# Patient Record
Sex: FEMALE | Race: BLACK OR AFRICAN AMERICAN | NOT HISPANIC OR LATINO | Employment: UNEMPLOYED | URBAN - METROPOLITAN AREA
[De-identification: names, ages, dates, MRNs, and addresses within clinical notes are randomized per-mention and may not be internally consistent; named-entity substitution may affect disease eponyms.]

---

## 2022-01-13 ENCOUNTER — LAB VISIT (OUTPATIENT)
Dept: PRIMARY CARE CLINIC | Facility: OTHER | Age: 47
End: 2022-01-13
Attending: INTERNAL MEDICINE
Payer: MEDICAID

## 2022-01-13 DIAGNOSIS — Z20.822 ENCOUNTER FOR LABORATORY TESTING FOR COVID-19 VIRUS: ICD-10-CM

## 2022-01-13 PROCEDURE — U0003 INFECTIOUS AGENT DETECTION BY NUCLEIC ACID (DNA OR RNA); SEVERE ACUTE RESPIRATORY SYNDROME CORONAVIRUS 2 (SARS-COV-2) (CORONAVIRUS DISEASE [COVID-19]), AMPLIFIED PROBE TECHNIQUE, MAKING USE OF HIGH THROUGHPUT TECHNOLOGIES AS DESCRIBED BY CMS-2020-01-R: HCPCS | Performed by: INTERNAL MEDICINE

## 2022-01-14 LAB
SARS-COV-2 RNA RESP QL NAA+PROBE: DETECTED
SARS-COV-2- CYCLE NUMBER: 22

## 2022-01-19 ENCOUNTER — LAB VISIT (OUTPATIENT)
Dept: PRIMARY CARE CLINIC | Facility: OTHER | Age: 47
End: 2022-01-19
Attending: INTERNAL MEDICINE
Payer: MEDICAID

## 2022-01-19 DIAGNOSIS — Z20.822 ENCOUNTER FOR LABORATORY TESTING FOR COVID-19 VIRUS: ICD-10-CM

## 2022-01-19 PROCEDURE — U0003 INFECTIOUS AGENT DETECTION BY NUCLEIC ACID (DNA OR RNA); SEVERE ACUTE RESPIRATORY SYNDROME CORONAVIRUS 2 (SARS-COV-2) (CORONAVIRUS DISEASE [COVID-19]), AMPLIFIED PROBE TECHNIQUE, MAKING USE OF HIGH THROUGHPUT TECHNOLOGIES AS DESCRIBED BY CMS-2020-01-R: HCPCS | Performed by: INTERNAL MEDICINE

## 2022-01-20 LAB
SARS-COV-2 RNA RESP QL NAA+PROBE: DETECTED
SARS-COV-2- CYCLE NUMBER: 31

## 2022-01-27 ENCOUNTER — LAB VISIT (OUTPATIENT)
Dept: PRIMARY CARE CLINIC | Facility: OTHER | Age: 47
End: 2022-01-27
Attending: INTERNAL MEDICINE
Payer: MEDICAID

## 2022-01-27 DIAGNOSIS — Z20.822 ENCOUNTER FOR LABORATORY TESTING FOR COVID-19 VIRUS: ICD-10-CM

## 2022-01-27 PROCEDURE — U0003 INFECTIOUS AGENT DETECTION BY NUCLEIC ACID (DNA OR RNA); SEVERE ACUTE RESPIRATORY SYNDROME CORONAVIRUS 2 (SARS-COV-2) (CORONAVIRUS DISEASE [COVID-19]), AMPLIFIED PROBE TECHNIQUE, MAKING USE OF HIGH THROUGHPUT TECHNOLOGIES AS DESCRIBED BY CMS-2020-01-R: HCPCS | Performed by: INTERNAL MEDICINE

## 2022-01-28 LAB
SARS-COV-2 RNA RESP QL NAA+PROBE: NOT DETECTED
SARS-COV-2- CYCLE NUMBER: NORMAL

## 2022-07-09 ENCOUNTER — HOSPITAL ENCOUNTER (EMERGENCY)
Facility: HOSPITAL | Age: 47
Discharge: HOME | End: 2022-07-09
Attending: EMERGENCY MEDICINE
Payer: MEDICAID

## 2022-07-09 VITALS
WEIGHT: 293 LBS | HEART RATE: 69 BPM | HEIGHT: 67 IN | BODY MASS INDEX: 45.99 KG/M2 | DIASTOLIC BLOOD PRESSURE: 89 MMHG | TEMPERATURE: 98 F | OXYGEN SATURATION: 100 % | RESPIRATION RATE: 18 BRPM | SYSTOLIC BLOOD PRESSURE: 177 MMHG

## 2022-07-09 DIAGNOSIS — M19.072 ARTHRITIS OF LEFT FOOT: ICD-10-CM

## 2022-07-09 DIAGNOSIS — R52 PAIN: ICD-10-CM

## 2022-07-09 DIAGNOSIS — M77.32 CALCANEAL SPUR OF LEFT FOOT: Primary | ICD-10-CM

## 2022-07-09 PROCEDURE — 99283 EMERGENCY DEPT VISIT LOW MDM: CPT

## 2022-07-09 RX ORDER — NAPROXEN 500 MG/1
500 TABLET ORAL 2 TIMES DAILY WITH MEALS
Qty: 15 TABLET | Refills: 0 | Status: SHIPPED | OUTPATIENT
Start: 2022-07-09 | End: 2022-09-12 | Stop reason: SDUPTHER

## 2022-07-09 NOTE — Clinical Note
"Ninoska Madridmushtaq" Jose M was seen and treated in our emergency department on 7/9/2022.  She may return to work on 07/12/2022.       If you have any questions or concerns, please don't hesitate to call.      Yang Harrison, DO"

## 2022-07-09 NOTE — ED PROVIDER NOTES
Encounter Date: 7/9/2022       History     Chief Complaint   Patient presents with    HEEL PAIN     LEFT , X 2MOS     47-year-old female presents emergency department with left heel pain.  Has been present off and on for the last 6 months.  Worse with ambulation worse with standing.  It got worse last night.  Described as sharp severe pain left heel.  She says that she tried to put ice on it but it did really help.  Definitely worse with standing on it.  Says she has tried different shoes in the past but none of them seem to really help.  Has not seen anyone for it yet.  Has not tried any medications yet.  Pain does not radiate described as an aching type pain.  No trauma fall or injury reported.  No fevers or chills reported.        Review of patient's allergies indicates:   Allergen Reactions    Aspirin      Past Medical History:   Diagnosis Date    Anemia, unspecified     Hypertension     Obese body habitus      No past surgical history on file.  No family history on file.     Review of Systems   Constitutional: Negative for chills and fever.   HENT: Negative for sore throat.    Respiratory: Negative for shortness of breath.    Cardiovascular: Negative for chest pain and palpitations.   Gastrointestinal: Negative for abdominal pain, nausea and vomiting.   Genitourinary: Negative for dysuria.   Musculoskeletal: Positive for arthralgias (Left heel). Negative for back pain.   Skin: Negative for rash.   Neurological: Negative for weakness and headaches.   Hematological: Does not bruise/bleed easily.   All other systems reviewed and are negative.      Physical Exam     Initial Vitals [07/09/22 1234]   BP Pulse Resp Temp SpO2   (!) 177/89 69 18 97.8 °F (36.6 °C) 100 %      MAP       --         Physical Exam    Nursing note and vitals reviewed.  Constitutional: She appears well-developed and well-nourished. No distress.   Elevated BMI   HENT:   Head: Normocephalic and atraumatic.   Mouth/Throat: No oropharyngeal  exudate.   Eyes: Conjunctivae and EOM are normal. Pupils are equal, round, and reactive to light.   Neck: Neck supple. No tracheal deviation present.   Cardiovascular: Normal rate, regular rhythm, normal heart sounds and intact distal pulses.   No murmur heard.  Pulmonary/Chest: Breath sounds normal. No stridor. No respiratory distress. She has no wheezes. She has no rhonchi. She has no rales.   Abdominal: Abdomen is soft. She exhibits no distension. There is no abdominal tenderness. There is no rebound.   Musculoskeletal:         General: No tenderness or edema. Normal range of motion.      Cervical back: Neck supple.      Comments: Left foot:  There is tenderness present to the calcaneus region.  No erythema no warmth no drainage no pointing or fluctuance.  Negative Rob test.  No tenderness to the midfoot neurovascular intact distally.  Normal capillary refill.  2+ pulse present in the dorsalis pedis artery and the posterior tibial artery.     Neurological: She is alert and oriented to person, place, and time. She has normal strength. No cranial nerve deficit or sensory deficit.   Skin: Skin is warm and dry. Capillary refill takes less than 2 seconds. No rash noted. No erythema. No pallor.   Psychiatric: She has a normal mood and affect. Thought content normal.         ED Course   Procedures  Labs Reviewed - No data to display       Imaging Results          X-Ray Foot Complete Left (Final result)  Result time 07/09/22 14:13:40    Final result by Timoteo Molina Jr., MD (07/09/22 14:13:40)                 Narrative:    Examination: 3 views of the left foot.    CLINICAL HISTORY: Left foot heel pain.    COMPARISON: None.    TECHNIQUE: AP, lateral, and oblique projections.    FINDINGS: Osseous structures are intact without evidence of an acute fracture deformity. Lisfranc joints are anatomically aligned. Early osteoarthritic changes about the great toe metatarsophalangeal joint. Mild hallux findings alignment.  Prominent plantar calcaneal heel spur and Achilles enthesophyte formation on lateral distraction. Mild degenerative spurring about the dorsal midfoot. Elongation the posterior talar process versus a benign os trigonum.    IMPRESSION: No acute radiographic abnormality. Prominent plantar calcaneal heel spur and Achilles enthesophyte formation though no secondary manifestations of plantar fasciitis.    Electronically signed by:  Timoteo oMlina MD  7/9/2022 2:13 PM CDT Workstation: [id number]                             X-Ray Calcaneus 2 View Left (Final result)  Result time 07/09/22 14:04:08    Final result by Timoteo Molina Jr., MD (07/09/22 14:04:08)                 Narrative:    Examination: 2 views of the os calcis.    CLINICAL HISTORY: Heel and foot pain for past 2 months. Patient standing at work all day    TECHNIQUE: AP and lateral views the os calcis.    FINDINGS: Osseous mineralization appears normal. Chronic osteoarthritic spurring about the dorsal midfoot and dorsal navicula. Elongation of the posterior talar process versus benign os trigonum. Prominent plantar calcaneal heel spur and an early enthesophyte formation at the Achilles tendon insertion point with fairly prominent hypertrophic reactive bone formation the posterior edge of the stress calcaneus. No evidence of an acute fracture. Subtalar joint is normal in radiographic appearance. Soft tissues are unremarkable.    IMPRESSION:  1. No acute as an etiology.  2. Prominent plantar calcaneal and Achilles enthesophyte formation.    Electronically signed by:  Timoteo Molina MD  7/9/2022 2:04 PM CDT Workstation: [id number]                               Medications - No data to display  Medical Decision Making:   ED Management:  Patient presents emergency department with heel pain as described above for months.  No obvious infectious etiology on exam, no fracture dislocation on x-ray.  Patient has significant spurring of her heel and significant  arthritis.  I counseled her on this and the importance of following up with Podiatry in well fitting shoes.  Counseled her on weight loss.  She will follow-up with podiatry on outpatient basis.    I had a detailed discussion with the patient and/or guardian regarding: The historical points, exam findings, and diagnostic results supporting the discharge diagnosis, lab results, pertinent radiology results, and the need for outpatient follow-up, for definitive care with a family practitioner, and a podiatrist and to return to the emergency department if symptoms worsen or persist or if there are any questions or concerns that arise at home. All questions have been answered in detail. Strict return to Emergency Department precautions have been provided.    A dictation software program was used for this note.  Please expect some simple typographical  errors in this note.                       Clinical Impression:   Final diagnoses:  [R52] Pain  [M77.32] Calcaneal spur of left foot (Primary)  [M19.072] Arthritis of left foot          ED Disposition Condition    Discharge Stable        ED Prescriptions     Medication Sig Dispense Start Date End Date Auth. Provider    naproxen (NAPROSYN) 500 MG tablet Take 1 tablet (500 mg total) by mouth 2 (two) times daily with meals. 15 tablet 7/9/2022  Yang Harrison, DO        Follow-up Information     Follow up With Specialties Details Why Contact Info Additional Information    [facility] - Emergency Dept Emergency Medicine  If symptoms worsen [address] [address]  [phone] 1st floor    Timoteo Hernandez DPM Podiatry, Surgery In 3 days  [address]  [address]  [place] [facility] [address]  [phone]              Yang Harrison DO  07/09/22 2125

## 2022-07-09 NOTE — DISCHARGE INSTRUCTIONS
RETURN TO EMERGENCY DEPARTMENT WITHOUT FAIL, IF YOUR SYMPTOMS WORSEN, IF YOU GET NEW OR DIFFERENT SYMPTOMS, IF YOU ARE UNABLE TO FOLLOW UP AS DIRECTED, OR IF YOU HAVE ANY CONCERNS OR WORRIES.    Wear proper shoes.  Follow-up with podiatry on an outpatient basis.  Anti-inflammatories for pain.

## 2022-08-24 ENCOUNTER — HOSPITAL ENCOUNTER (EMERGENCY)
Facility: HOSPITAL | Age: 47
Discharge: HOME | End: 2022-08-24
Attending: EMERGENCY MEDICINE
Payer: MEDICAID

## 2022-08-24 VITALS
SYSTOLIC BLOOD PRESSURE: 164 MMHG | RESPIRATION RATE: 18 BRPM | BODY MASS INDEX: 45.99 KG/M2 | HEART RATE: 78 BPM | HEIGHT: 67 IN | OXYGEN SATURATION: 97 % | WEIGHT: 293 LBS | TEMPERATURE: 98 F | DIASTOLIC BLOOD PRESSURE: 86 MMHG

## 2022-08-24 DIAGNOSIS — M77.31 CALCANEAL SPUR OF RIGHT FOOT: Primary | ICD-10-CM

## 2022-08-24 DIAGNOSIS — R52 PAIN: ICD-10-CM

## 2022-08-24 DIAGNOSIS — M19.071 ARTHRITIS OF FOOT, RIGHT: ICD-10-CM

## 2022-08-24 PROCEDURE — 99283 EMERGENCY DEPT VISIT LOW MDM: CPT

## 2022-08-24 NOTE — ED PROVIDER NOTES
Encounter Date: 8/24/2022       History     Chief Complaint   Patient presents with    Foot Pain     Right foot pain x unknown period. Pt reports a work history of long hours standing on her feet. Pt has been dx with a spur on the left heel before. No redness or swelling present. Pt ambulated to triage without difficulty.      Ninoska Salguero is a 47 year old female with pmh HTN presenting to the ED with c/o right foot pain. She reports similar pain in the left foot that has been diagnosed as bone spurs and arthritis. She denies injury/trauma and states that she stands on her feet for long periods of time. She reports pain began 2 weeks ago and has been slightly improved with ibuprofen. She has had no calf pain/swelling.         Review of patient's allergies indicates:   Allergen Reactions    Aspirin      Past Medical History:   Diagnosis Date    Anemia, unspecified     Hypertension     Obese body habitus      No past surgical history on file.  No family history on file.     Review of Systems   Constitutional: Negative for fever.   HENT: Negative for sore throat.    Respiratory: Negative for shortness of breath.    Cardiovascular: Negative for chest pain.   Gastrointestinal: Negative for nausea.   Genitourinary: Negative for dysuria.   Musculoskeletal: Positive for arthralgias (bilateral foot pain; new in right foot) and gait problem (pain with weight bearing). Negative for back pain.   Skin: Negative for rash.   Neurological: Negative for weakness.   Hematological: Does not bruise/bleed easily.       Physical Exam     Initial Vitals [08/24/22 0929]   BP Pulse Resp Temp SpO2   (!) 193/105 78 18 98.2 °F (36.8 °C) 97 %      MAP       --         Physical Exam    Nursing note and vitals reviewed.  Constitutional: She appears well-developed and well-nourished. She is not diaphoretic. No distress.   HENT:   Head: Normocephalic and atraumatic.   Mouth/Throat: Oropharynx is clear and moist.   Eyes: Conjunctivae are  normal.   Neck: Neck supple.   Cardiovascular: Normal rate, regular rhythm, normal heart sounds and intact distal pulses. Exam reveals no gallop and no friction rub.    No murmur heard.  Pulmonary/Chest: Breath sounds normal. No respiratory distress. She has no wheezes. She has no rhonchi. She has no rales.   Musculoskeletal:         General: Normal range of motion.      Cervical back: Neck supple.      Right foot: Normal capillary refill. Tenderness (heel of foot) present. No swelling or deformity. Normal pulse.      Comments: No calf tenderness/swelling. No palpable cord     Neurological: She is alert and oriented to person, place, and time.   Skin: No rash noted. No erythema.   Psychiatric: Her speech is normal.         ED Course   Procedures  Labs Reviewed - No data to display       Imaging Results          X-Ray Foot Complete Right (Final result)  Result time 08/24/22 11:22:26    Final result by Ermelinda Lyman IV, MD (08/24/22 11:22:26)                 Narrative:    Right foot, 3 views    HISTORY: Nontraumatic foot pain.    No acute fracture, dislocation or osseous destruction is observed. Osteoarthritic changes are demonstrated within the midfoot. There is mild degenerative tearing of the first MTP joint. Enthesophyte/spur formation is noted along the calcaneal tuberosity. No focal soft tissue abnormality identified.    IMPRESSION:    Osteoarthritic changes.    Calcaneal spur/enthesophyte formation.    Electronically signed by:  Ermelinda Lyman MD  8/24/2022 11:22 AM CDT Workstation: [id number]                               Medications - No data to display        APC / Resident Notes:   The patient has findings consistent with calcaneal spur as well as osteoarthritis on xray. There is no fracture/dislocation. She has no symptoms concerning for DVT/arterial occlusion. Also no exam findings concerning for infection/gout. Her BP is elevated and she states she did not take her prescribed BP medications this morning.  She was advised to take her BP medication as soon as possible and to return if she develops any new or worsening symptoms. Will refer to podiatry for follow up as well. Based on my clinical evaluation, I do not appreciate any immediate, emergent, or life threatening condition or etiology that warrants additional workup today and feel that the patient can be discharged with close follow up care.                    Clinical Impression:   Final diagnoses:  [R52] Pain  [M77.31] Calcaneal spur of right foot (Primary)  [M19.071] Arthritis of foot, right          ED Disposition Condition    Discharge Stable        ED Prescriptions     None        Follow-up Information     Follow up With Specialties Details Why Contact Info Additional Information    [facility] - Emergency Dept Emergency Medicine  As needed, If symptoms worsen [address]  [facility] [address]  [phone] 1st floor    [facility]  Schedule an appointment as soon as possible for a visit   [facility] [address]   [facility] [address]  [phone]              Dorinda Betts NP  08/24/22 1141

## 2022-08-24 NOTE — Clinical Note
"Ninoska Browncelio" Jose M was seen and treated in our emergency department on 8/24/2022.  She may return to work on 08/27/2022.       If you have any questions or concerns, please don't hesitate to call.      Robe Mesa RN    "

## 2022-09-12 ENCOUNTER — OFFICE VISIT (OUTPATIENT)
Dept: PODIATRY | Facility: CLINIC | Age: 47
End: 2022-09-12
Payer: MEDICAID

## 2022-09-12 VITALS — HEIGHT: 67 IN | BODY MASS INDEX: 45.99 KG/M2 | WEIGHT: 293 LBS

## 2022-09-12 DIAGNOSIS — M19.071 ARTHRITIS OF FOOT, RIGHT: ICD-10-CM

## 2022-09-12 DIAGNOSIS — M77.31 CALCANEAL SPUR OF RIGHT FOOT: Primary | ICD-10-CM

## 2022-09-12 PROCEDURE — 1160F PR REVIEW ALL MEDS BY PRESCRIBER/CLIN PHARMACIST DOCUMENTED: ICD-10-PCS | Mod: CPTII,S$GLB,, | Performed by: PODIATRIST

## 2022-09-12 PROCEDURE — 1159F PR MEDICATION LIST DOCUMENTED IN MEDICAL RECORD: ICD-10-PCS | Mod: CPTII,S$GLB,, | Performed by: PODIATRIST

## 2022-09-12 PROCEDURE — 99203 OFFICE O/P NEW LOW 30 MIN: CPT | Mod: S$GLB,,, | Performed by: PODIATRIST

## 2022-09-12 PROCEDURE — 3008F BODY MASS INDEX DOCD: CPT | Mod: CPTII,S$GLB,, | Performed by: PODIATRIST

## 2022-09-12 PROCEDURE — 3008F PR BODY MASS INDEX (BMI) DOCUMENTED: ICD-10-PCS | Mod: CPTII,S$GLB,, | Performed by: PODIATRIST

## 2022-09-12 PROCEDURE — 1160F RVW MEDS BY RX/DR IN RCRD: CPT | Mod: CPTII,S$GLB,, | Performed by: PODIATRIST

## 2022-09-12 PROCEDURE — 1159F MED LIST DOCD IN RCRD: CPT | Mod: CPTII,S$GLB,, | Performed by: PODIATRIST

## 2022-09-12 PROCEDURE — 99203 PR OFFICE/OUTPT VISIT, NEW, LEVL III, 30-44 MIN: ICD-10-PCS | Mod: S$GLB,,, | Performed by: PODIATRIST

## 2022-09-12 RX ORDER — LISINOPRIL 40 MG/1
40 TABLET ORAL DAILY
COMMUNITY

## 2022-09-12 RX ORDER — NAPROXEN 500 MG/1
500 TABLET ORAL 2 TIMES DAILY WITH MEALS
Qty: 15 TABLET | Refills: 2 | Status: SHIPPED | OUTPATIENT
Start: 2022-09-12 | End: 2022-09-12 | Stop reason: CLARIF

## 2022-09-12 RX ORDER — NAPROXEN 500 MG/1
500 TABLET ORAL 2 TIMES DAILY
Qty: 60 TABLET | Refills: 1 | Status: SHIPPED | OUTPATIENT
Start: 2022-09-12 | End: 2022-11-23

## 2022-09-12 RX ORDER — FERROUS SULFATE 325(65) MG
325 TABLET ORAL DAILY
COMMUNITY

## 2022-09-12 NOTE — PROGRESS NOTES
"  [address] [place] [address]  Phone: [phone]   [phone]    Patient's PCP:Primary Doctor No  Referring Provider: Dorinda Betts    Subjective:      Chief Complaint:: Foot Pain (Bilateral foot pain)    KSENIA Salguero is a 47 y.o. female who presents today with a complaint of bilateral foot pain lasting for a few months. Onset of symptoms back in January and reports no trauma.  Current symptoms include pain on medial side of bilateral feet, as well as heel pain.  Aggravating factors are walking and standing for long periods of time. Symptoms have progressed. Patient rates pain 10/10.        Vitals:    09/12/22 1054   Weight: (!) 142.4 kg (314 lb)   Height: 5' 7" (1.702 m)   PainSc: 10-Worst pain ever      Shoe Size: 11    No past surgical history on file.  Past Medical History:   Diagnosis Date    Anemia, unspecified     Hypertension     Obese body habitus      No family history on file.     Social History:   Marital Status: Single  Alcohol History:  has no history on file for alcohol use.  Tobacco History:  has no history on file for tobacco use.  Drug History:  has no history on file for drug use.    Review of patient's allergies indicates:   Allergen Reactions    Aspirin        Current Outpatient Medications   Medication Sig Dispense Refill    ferrous sulfate (FEOSOL) 325 mg (65 mg iron) Tab tablet       lisinopriL (PRINIVIL,ZESTRIL) 40 MG tablet       naproxen (NAPROSYN) 500 MG tablet Take 1 tablet (500 mg total) by mouth 2 (two) times daily with meals. 15 tablet 0     No current facility-administered medications for this visit.       Review of Systems   Constitutional: Negative.    HENT: Negative.     Eyes: Negative.    Respiratory: Negative.     Cardiovascular: Negative.    Gastrointestinal: Negative.    Endocrine: Negative.    Genitourinary: Negative.    Musculoskeletal: Negative.    Skin: Negative.    Allergic/Immunologic: Negative.    Neurological: Negative.  "   Hematological: Negative.    Psychiatric/Behavioral: Negative.         Objective:        Physical Exam:   Foot Exam    General  General Appearance: appears stated age and healthy   Orientation: alert and oriented to person, place, and time   Affect: appropriate   Gait: antalgic       Right Foot/Ankle     Inspection and Palpation  Ecchymosis: none  Tenderness: calcaneus tenderness, lesser metatarsophalangeal joints and plantar fascia   Swelling: none   Arch: pes planus  Hammertoes: absent  Claw Toes: absent  Hallux valgus: no  Hallux limitus: yes  Skin Exam: tinea;   Fungus Toenails: not present  Neurovascular  Dorsalis pedis: 2+  Posterior tibial: 2+  Capillary Refill: 2+  Saphenous nerve sensation: normal  Tibial nerve sensation: normal  Superficial peroneal nerve sensation: normal  Deep peroneal nerve sensation: normal  Sural nerve sensation: normal    Muscle Strength  Ankle dorsiflexion: 5  Ankle plantar flexion: 5  Ankle inversion: 5  Ankle eversion: 5  Great toe extension: 5  Great toe flexion: 5    Range of Motion    Normal right ankle ROM  Passive  Ankle dorsiflexion: 10    Active  Ankle dorsiflexion: 10    Tests  Calcaneal squeeze: negative   PT Tinel's sign: negative    Paresthesia: negative  Comments  Pain on palpation of the infeior medial heel and central plantar heel. No pain present  with side to side compression of the calcaneus. Negative tinnel's sign  at the tarsal tunnel. Negative Teran's nerve pain. Negative Calcaneal nerve pain. No soft tissue masses. Pain present with dorsiflexion of the ankle. No edema, erythema, or ecchymosis noted.      Left Foot/Ankle      Inspection and Palpation  Ecchymosis: none  Tenderness: lesser metatarsophalangeal joints, plantar fascia and calcaneus tenderness   Swelling: none   Arch: pes planus  Hammertoes: absent  Claw toes: absent  Hallux valgus: no  Hallux limitus: yes  Skin Exam: tinea;   Fungus Toenails: not present    Neurovascular  Dorsalis pedis:  2+  Posterior tibial: 2+  Capillary refill: 2+  Saphenous nerve sensation: normal  Tibial nerve sensation: normal  Superficial peroneal nerve sensation: normal  Deep peroneal nerve sensation: normal  Sural nerve sensation: normal    Muscle Strength  Ankle dorsiflexion: 5  Ankle plantar flexion: 5  Ankle inversion: 5  Ankle eversion: 5  Great toe extension: 5  Great toe flexion: 5    Range of Motion    Normal left ankle ROM  Passive  Ankle dorsiflexion: 10    Active  Ankle dorsiflexion: 10    Tests  Calcaneal squeeze: negative   PT Tinel's sign: negative  Paresthesia: negative  Comments  Pain on palpation of the infeior medial heel and central plantar heel. No pain present  with side to side compression of the calcaneus. Negative tinnel's sign  at the tarsal tunnel. Negative Teran's nerve pain. Negative Calcaneal nerve pain. No soft tissue masses. Pain present with dorsiflexion of the ankle. No edema, erythema, or ecchymosis noted.    Physical Exam  Cardiovascular:      Pulses:           Dorsalis pedis pulses are 2+ on the right side and 2+ on the left side.        Posterior tibial pulses are 2+ on the right side and 2+ on the left side.   Musculoskeletal:      Right foot: No bunion.      Left foot: No bunion.        Feet:    Feet:      Right foot:      Toenail Condition: Fungal disease     Left foot:      Toenail Condition: Fungal disease            Right Ankle/Foot Exam     Tenderness   The patient is tender to palpation of the lesser metatarsophalangeal joints.    Range of Motion   The patient has normal right ankle ROM.    Comments:  Pain on palpation of the infeior medial heel and central plantar heel. No pain present  with side to side compression of the calcaneus. Negative tinnel's sign  at the tarsal tunnel. Negative Teran's nerve pain. Negative Calcaneal nerve pain. No soft tissue masses. Pain present with dorsiflexion of the ankle. No edema, erythema, or ecchymosis noted.      Left Ankle/Foot Exam      Tenderness   The patient is tender to palpation of the lesser metatarsophalangeal joints.    Range of Motion   The patient has normal left ankle ROM.     Muscle Strength   The patient has normal left ankle strength.    Comments:  Pain on palpation of the infeior medial heel and central plantar heel. No pain present  with side to side compression of the calcaneus. Negative tinnel's sign  at the tarsal tunnel. Negative Teran's nerve pain. Negative Calcaneal nerve pain. No soft tissue masses. Pain present with dorsiflexion of the ankle. No edema, erythema, or ecchymosis noted.        Muscle Strength   Right Lower Extremity   Ankle Dorsiflexion:  5   Plantar flexion:  5/5  Left Lower Extremity   Ankle Dorsiflexion:  5   Plantar flexion:  5/5     Vascular Exam     Right Pulses  Dorsalis Pedis:      2+  Posterior Tibial:      2+        Left Pulses  Dorsalis Pedis:      2+  Posterior Tibial:      2+         Imaging:   Reviewed her x-rays from outside source of nonweightbearing of both feet which show inferior posterior calcaneal exostosis and multiple dorsal osteophytes at Lisfranc send mid tarsal joint.  No bone tumors no soft tissue masses seen.         Assessment:       1. Calcaneal spur of right foot    2. Arthritis of foot, right      Plan:   Calcaneal spur of right foot  -     Ambulatory referral/consult to Podiatry    Arthritis of foot, right  -     Ambulatory referral/consult to Podiatry      1. Evaluated patient long discussion with her about clinical findings of plantar fasciitis of both feet arthritis of both feet worse than left.  We will try conservative care for the plantar fasciitis and see if this helps with the fasciitis and her arthritis.  I told her is no way can promise or 100% pain relief because the arthritic changes in the foot.    Discussed different treatment options for heel pain. I gave written and verbal instructions on heel cord stretching and this was demonstrated for the patient. Patient  expressed understanding. Discussed wearing appropriate shoe gear and avoiding flats, slippers, sandals, barefoot, and sockfeet. Recommended arch supports. My recommendation for OTC supports is Spenco polysorb replacement insoles or patient may elect more aggressive treatment with prescription arch supports. We also discussed cortisone injections and NSAID therapy.      Plantar Fasciitis Level I Treatment:   Anti-inflammatory Naprosyn 501 pill twice a day with food  No bare feet  Over the counter insert cross trainers by Alyssa  Restrict activity level   Use running shoes at full time refer to varsity sports  No impact exercise and stretch gastrocnemius muscle   Return in 4 weeks if not at least 30% improvement.  If doing better continue treatment until resolved.    I discussed the conservative treatent of arthritis consisiting of shoe modification, OTC NSAID, cortisone shots, a series of supartz injections, avoiding painful activities and common sense.  I explained that the arthritis may become more painful causng more disability and the possibility of further treatment.  Also discussed may need evaluation by Rheumatologist for possible inflammatory arthritis.  We will see she gets good relief with the arthritis with the treatment for the plantar fasciitis with shoes and inserts.    Procedures          Counseling:     I provided patient education verbally regarding:   Patient diagnosis, treatment options, as well as alternatives, risks, and benefits.     This note was created using Dragon voice recognition software that occasionally misinterpreted phrases or words.

## 2022-09-12 NOTE — PATIENT INSTRUCTIONS
What Is Arthritis in the Foot?  Degenerative arthritis is a condition that slowly wears away joints, the area where bones meet and move. In the beginning, you may notice that the affected joint seems stiff. It may even ache. As the joint lining (cartilage) breaks down, the bones rub against each other, causing pain and swelling. Over time, small pieces of rough or splintered bone (bone spurs) develop, and the joints range of motion becomes limited. But movement doesnt have to cause pain. The effects of arthritis can be reduced.    The big-toe joint  When arthritis affects your big toe, your foot hurts when it pushes off the ground. Arthritis often appears in the big-toe joint along with a bunion (a bony bump at the side of the joint) or a bone spur on top of the joint.    Other joints  When arthritis affects the rear or midfoot joints, you feel pain when you put weight on your foot. Arthritis may affect the joint where the ankle and foot meet. It may also affect other joints nearby.  Date Last Reviewed: 7/1/2016 © 2000-2017 [employer]. [address] [place] [address], [place] [address]. All rights reserved. This information is not intended as a substitute for professional medical care. Always follow your healthcare professional's instructions.

## 2022-10-25 ENCOUNTER — HOSPITAL ENCOUNTER (EMERGENCY)
Facility: HOSPITAL | Age: 47
Discharge: HOME | End: 2022-10-25
Attending: EMERGENCY MEDICINE
Payer: MEDICAID

## 2022-10-25 VITALS
WEIGHT: 293 LBS | SYSTOLIC BLOOD PRESSURE: 185 MMHG | HEART RATE: 86 BPM | BODY MASS INDEX: 45.99 KG/M2 | HEIGHT: 67 IN | DIASTOLIC BLOOD PRESSURE: 88 MMHG | OXYGEN SATURATION: 99 % | TEMPERATURE: 99 F | RESPIRATION RATE: 16 BRPM

## 2022-10-25 DIAGNOSIS — N92.0 MENORRHAGIA: ICD-10-CM

## 2022-10-25 DIAGNOSIS — O03.4 INCOMPLETE ABORTION: Primary | ICD-10-CM

## 2022-10-25 LAB
ABO + RH BLD: NORMAL
ALBUMIN SERPL BCP-MCNC: 3.7 G/DL (ref 3.5–5.2)
ALP SERPL-CCNC: 56 U/L (ref 55–135)
ALT SERPL W/O P-5'-P-CCNC: 15 U/L (ref 10–44)
ANION GAP SERPL CALC-SCNC: 4 MMOL/L (ref 8–16)
AST SERPL-CCNC: 15 U/L (ref 10–40)
B-HCG UR QL: POSITIVE
BACTERIA #/AREA URNS HPF: NEGATIVE /HPF
BASOPHILS # BLD AUTO: 0.03 K/UL (ref 0–0.2)
BASOPHILS NFR BLD: 0.3 % (ref 0–1.9)
BILIRUB SERPL-MCNC: 0.3 MG/DL (ref 0.1–1)
BILIRUB UR QL STRIP: NEGATIVE
BLD GP AB SCN CELLS X3 SERPL QL: NORMAL
BUN SERPL-MCNC: 14 MG/DL (ref 6–20)
CALCIUM SERPL-MCNC: 8.8 MG/DL (ref 8.7–10.5)
CHLORIDE SERPL-SCNC: 110 MMOL/L (ref 95–110)
CLARITY UR: ABNORMAL
CO2 SERPL-SCNC: 24 MMOL/L (ref 23–29)
COLOR UR: YELLOW
CREAT SERPL-MCNC: 0.8 MG/DL (ref 0.5–1.4)
CTP QC/QA: YES
DIFFERENTIAL METHOD: ABNORMAL
EOSINOPHIL # BLD AUTO: 0.1 K/UL (ref 0–0.5)
EOSINOPHIL NFR BLD: 1.3 % (ref 0–8)
ERYTHROCYTE [DISTWIDTH] IN BLOOD BY AUTOMATED COUNT: 18.3 % (ref 11.5–14.5)
EST. GFR  (NO RACE VARIABLE): >60 ML/MIN/1.73 M^2
GLUCOSE SERPL-MCNC: 99 MG/DL (ref 70–110)
GLUCOSE UR QL STRIP: NEGATIVE
HCG INTACT+B SERPL-ACNC: 2259 MIU/ML
HCT VFR BLD AUTO: 35.5 % (ref 37–48.5)
HGB BLD-MCNC: 10.7 G/DL (ref 12–16)
HGB UR QL STRIP: ABNORMAL
HYALINE CASTS #/AREA URNS LPF: 4 /LPF
IMM GRANULOCYTES # BLD AUTO: 0.02 K/UL (ref 0–0.04)
IMM GRANULOCYTES NFR BLD AUTO: 0.2 % (ref 0–0.5)
KETONES UR QL STRIP: ABNORMAL
LEUKOCYTE ESTERASE UR QL STRIP: NEGATIVE
LYMPHOCYTES # BLD AUTO: 3.2 K/UL (ref 1–4.8)
LYMPHOCYTES NFR BLD: 31.8 % (ref 18–48)
MCH RBC QN AUTO: 22.7 PG (ref 27–31)
MCHC RBC AUTO-ENTMCNC: 30.1 G/DL (ref 32–36)
MCV RBC AUTO: 75 FL (ref 82–98)
MICROSCOPIC COMMENT: ABNORMAL
MONOCYTES # BLD AUTO: 0.6 K/UL (ref 0.3–1)
MONOCYTES NFR BLD: 5.5 % (ref 4–15)
NEUTROPHILS # BLD AUTO: 6.1 K/UL (ref 1.8–7.7)
NEUTROPHILS NFR BLD: 60.9 % (ref 38–73)
NITRITE UR QL STRIP: NEGATIVE
NRBC BLD-RTO: 0 /100 WBC
PH UR STRIP: 6 [PH] (ref 5–8)
PLATELET # BLD AUTO: 297 K/UL (ref 150–450)
PMV BLD AUTO: 10.1 FL (ref 9.2–12.9)
POTASSIUM SERPL-SCNC: 3.6 MMOL/L (ref 3.5–5.1)
PROT SERPL-MCNC: 7.7 G/DL (ref 6–8.4)
PROT UR QL STRIP: ABNORMAL
RBC # BLD AUTO: 4.71 M/UL (ref 4–5.4)
RBC #/AREA URNS HPF: >100 /HPF (ref 0–4)
RH BLD: NORMAL
SODIUM SERPL-SCNC: 138 MMOL/L (ref 136–145)
SP GR UR STRIP: 1.03 (ref 1–1.03)
SQUAMOUS #/AREA URNS HPF: 3 /HPF
URN SPEC COLLECT METH UR: ABNORMAL
UROBILINOGEN UR STRIP-ACNC: NEGATIVE EU/DL
WBC # BLD AUTO: 9.94 K/UL (ref 3.9–12.7)
WBC #/AREA URNS HPF: 14 /HPF (ref 0–5)

## 2022-10-25 PROCEDURE — 96372 THER/PROPH/DIAG INJ SC/IM: CPT | Performed by: STUDENT IN AN ORGANIZED HEALTH CARE EDUCATION/TRAINING PROGRAM

## 2022-10-25 PROCEDURE — 63600175 PHARM REV CODE 636 W HCPCS: Performed by: STUDENT IN AN ORGANIZED HEALTH CARE EDUCATION/TRAINING PROGRAM

## 2022-10-25 PROCEDURE — 80053 COMPREHEN METABOLIC PANEL: CPT | Performed by: STUDENT IN AN ORGANIZED HEALTH CARE EDUCATION/TRAINING PROGRAM

## 2022-10-25 PROCEDURE — 86901 BLOOD TYPING SEROLOGIC RH(D): CPT | Mod: 91 | Performed by: STUDENT IN AN ORGANIZED HEALTH CARE EDUCATION/TRAINING PROGRAM

## 2022-10-25 PROCEDURE — 81025 URINE PREGNANCY TEST: CPT | Performed by: STUDENT IN AN ORGANIZED HEALTH CARE EDUCATION/TRAINING PROGRAM

## 2022-10-25 PROCEDURE — 85025 COMPLETE CBC W/AUTO DIFF WBC: CPT | Performed by: STUDENT IN AN ORGANIZED HEALTH CARE EDUCATION/TRAINING PROGRAM

## 2022-10-25 PROCEDURE — 99285 EMERGENCY DEPT VISIT HI MDM: CPT | Mod: 25

## 2022-10-25 PROCEDURE — 36415 COLL VENOUS BLD VENIPUNCTURE: CPT | Performed by: EMERGENCY MEDICINE

## 2022-10-25 PROCEDURE — 81001 URINALYSIS AUTO W/SCOPE: CPT | Performed by: STUDENT IN AN ORGANIZED HEALTH CARE EDUCATION/TRAINING PROGRAM

## 2022-10-25 PROCEDURE — 86901 BLOOD TYPING SEROLOGIC RH(D): CPT | Performed by: STUDENT IN AN ORGANIZED HEALTH CARE EDUCATION/TRAINING PROGRAM

## 2022-10-25 PROCEDURE — 84702 CHORIONIC GONADOTROPIN TEST: CPT | Performed by: EMERGENCY MEDICINE

## 2022-10-25 RX ORDER — METHYLERGONOVINE MALEATE 0.2 MG/1
0.2 TABLET ORAL EVERY 6 HOURS
Qty: 4 TABLET | Refills: 0 | Status: SHIPPED | OUTPATIENT
Start: 2022-10-25 | End: 2022-10-26

## 2022-10-25 RX ORDER — METHYLERGONOVINE MALEATE 0.2 MG/ML
200 INJECTION INTRAVENOUS ONCE
Status: COMPLETED | OUTPATIENT
Start: 2022-10-25 | End: 2022-10-25

## 2022-10-25 RX ORDER — KETOROLAC TROMETHAMINE 30 MG/ML
10 INJECTION, SOLUTION INTRAMUSCULAR; INTRAVENOUS
Status: COMPLETED | OUTPATIENT
Start: 2022-10-25 | End: 2022-10-25

## 2022-10-25 RX ADMIN — KETOROLAC TROMETHAMINE 10 MG: 30 INJECTION, SOLUTION INTRAMUSCULAR; INTRAVENOUS at 06:10

## 2022-10-25 RX ADMIN — METHYLERGONOVINE MALEATE 200 MCG: 0.2 INJECTION, SOLUTION INTRAMUSCULAR; INTRAVENOUS at 11:10

## 2022-10-25 NOTE — ED PROVIDER NOTES
Encounter Date: 10/25/2022       History     Chief Complaint   Patient presents with    Abdominal Pain     Since Saturday.    Vaginal Bleeding     States her cycle started Friday, but reports heavier bleeding than normal and passing clots.      Patient is a 47 year old woman presenting for 4 days of vaginal bleeding with clots. States that this is different from her usual cycle. Reports mild lower abdominal pain and some weakness accompanying the bleeding. No syncope. No chest pain. No shortness of breath. States that she is sexually active with one person and does not use birth control. States that she feels it is unlikely she is pregnant. States that she does not have an OBGYN. Has two children. Med hx significant for hypertension.    Review of patient's allergies indicates:   Allergen Reactions    Aspirin      Past Medical History:   Diagnosis Date    Anemia, unspecified     Hypertension     Obese body habitus      No past surgical history on file.  No family history on file.     Review of Systems   Constitutional:  Positive for fatigue. Negative for activity change, appetite change, diaphoresis and fever.   HENT:  Negative for sore throat.    Eyes:  Negative for visual disturbance.   Respiratory:  Negative for shortness of breath.    Cardiovascular:  Negative for chest pain and palpitations.   Gastrointestinal:  Positive for abdominal pain. Negative for abdominal distention, constipation, diarrhea, nausea and vomiting.   Genitourinary:  Positive for pelvic pain and vaginal bleeding. Negative for difficulty urinating, dysuria and vaginal pain.   Musculoskeletal:  Negative for back pain.   Skin:  Negative for rash.   Neurological:  Negative for syncope, weakness and headaches.   Hematological:  Does not bruise/bleed easily.     Physical Exam     Initial Vitals   BP Pulse Resp Temp SpO2   10/25/22 1800 10/25/22 1800 10/25/22 1800 10/25/22 1802 10/25/22 1800   (!) 176/79 90 18 98.9 °F (37.2 °C) 100 %      MAP        --                Physical Exam    Nursing note and vitals reviewed.  Constitutional: She appears well-developed and well-nourished. She is not diaphoretic. No distress.   HENT:   Head: Normocephalic and atraumatic.   Eyes: Conjunctivae and EOM are normal.   Neck: Neck supple.   Normal range of motion.  Cardiovascular:  Normal rate.           Pulmonary/Chest: Breath sounds normal. No respiratory distress.   Abdominal: Abdomen is soft. She exhibits no distension. There is no abdominal tenderness. There is no rebound and no guarding.   Genitourinary: Right adnexum displays no mass, no tenderness and no fullness. Left adnexum displays no mass, no tenderness and no fullness.    Genitourinary Comments: Products of conception present in vaginal vault. No significant blood in vault. Incomplete removal of products of conception via ring forceps. Chaperone present.     Musculoskeletal:         General: Normal range of motion.      Cervical back: Normal range of motion and neck supple.     Neurological: She is alert and oriented to person, place, and time.   Skin: Skin is warm and dry.       ED Course   Procedures  Labs Reviewed   CBC W/ AUTO DIFFERENTIAL - Abnormal; Notable for the following components:       Result Value    Hemoglobin 10.7 (*)     Hematocrit 35.5 (*)     MCV 75 (*)     MCH 22.7 (*)     MCHC 30.1 (*)     RDW 18.3 (*)     All other components within normal limits   COMPREHENSIVE METABOLIC PANEL - Abnormal; Notable for the following components:    Anion Gap 4 (*)     All other components within normal limits   URINALYSIS - Abnormal; Notable for the following components:    Appearance, UA Hazy (*)     Protein, UA Trace (*)     Ketones, UA Trace (*)     Occult Blood UA 3+ (*)     All other components within normal limits    Narrative:     Collection Type->Urine, Clean Catch   URINALYSIS MICROSCOPIC - Abnormal; Notable for the following components:    RBC, UA >100 (*)     WBC, UA 14 (*)     Hyaline Casts, UA 4  (*)     All other components within normal limits    Narrative:     Collection Type->Urine, Clean Catch   POCT URINE PREGNANCY - Abnormal; Notable for the following components:    POC Preg Test, Ur Positive (*)     All other components within normal limits   HCG, QUANTITATIVE   HCG, QUANTITATIVE   RH TYPING   TYPE & SCREEN          Imaging Results              US OB <14 Wks TransAbd & TransVag, Single Gestation (XPD) (Final result)  Result time 10/25/22 20:10:36   Procedure changed from US Transvaginal Non OB     Final result by Cecelia Aleman MD (10/25/22 20:10:36)                   Narrative:    EXAM: US OB <14 WEEKS, TRANSABDOM & TRANSVAG, SINGLE GESTATION (XPD)    INDICATION:   menorrhagia    COMPARISON:  None.    TECHNIQUE:  Transabdominal and transvaginal scanning was performed with grayscale and color Doppler imaging.    FINDINGS:    Uterus:  Measures  12.3   x  6.4    x  6.9    cm. No uterine masses are identified.    Endometrium:  Measures  5    mm. No endometrial masses or abnormal fluid collections. No intrauterine pregnancy is visualized.    Right ovary was not visualized secondary to overlying bowel gas.    Left ovary was not visualized secondary to overlying bowel gas.    Free fluid:  None.    Other findings:  None of significance.    IMPRESSION:    No intrauterine pregnancy visualized.    Electronically signed by:  Cecelia Aleman MD  10/25/2022 8:10 PM CDT Workstation: [id number]                                     Medications   methylergonovine injection 200 mcg (has no administration in time range)   ketorolac injection 9.999 mg (9.999 mg Intramuscular Given 10/25/22 6179)     Medical Decision Making:   Initial Assessment:   47 year old woman presenting for vaginal bleeding with clots.   Differential Diagnosis:   Menorrhagia, fibroids, intrauterine pregnancy, ectopic pregnancy, miscarriage, PID  Clinical Tests:   Lab Tests: Ordered and Reviewed  Radiological Study: Ordered and Reviewed  Medical  Tests: Ordered and Reviewed  ED Management:  Patient anemic but not requiring transfusion. UPT positive. Transvaginal ultrasound without intrauterine pregnancy. Pelvic exam showing incomplete , with products of conception in vaginal vault. Could not fully remove all products of conception 2/2 difficult pelvic exam due to body habitus. Could not view cervix. Spoke with OBGYN on call Dr. San. He recommends methergine 0.2 IM here, and then methergine 0.2 PO for 4 doses, every 6 hours. He will see the patient in clinic on Friday.     Obtained Rh typing to determine if patient requires Rhogam. That study is pending at my shift change.     Dr. Lucas will follow up this finding and will discharge the patient.            ED Course as of 10/25/22 2255   Tue Oct 25, 2022   1912 Preg Test, Ur(!): Positive [EL]    Note that I ordered the toradol before I knew patient was pregnant [EL]    US OB <14 Wks TransAbd & TransVag, Single Gestation (XPD)  No intrauterine pregnancy visualized. [EL]    HCG Quant: 2259 [EL]      ED Course User Index  [EL] Jessica Hector MD                 Clinical Impression:   Final diagnoses:  [N92.0] Menorrhagia  [O03.4] Incomplete  (Primary)      ED Disposition Condition    Discharge Stable          ED Prescriptions       Medication Sig Dispense Start Date End Date Auth. Provider    methylergonovine (METHERGINE) 0.2 mg tablet Take 1 tablet (0.2 mg total) by mouth every 6 (six) hours. for 4 doses 4 tablet 10/25/2022 10/26/2022 Jessica Hector MD          Follow-up Information       Follow up With Specialties Details Why Contact Info Additional Information    [facility] - Emergency Dept Emergency Medicine Go to  As needed, If symptoms worsen [address] [facility] [address] [phone] 1st floor    Jun San MD Obstetrics and Gynecology Go on 10/28/2022 call to determine what time your appointment is [address]  [facility]  [address]  [place] [address]  [phone]                Jessica Hector MD  Resident  10/25/22 9908

## 2022-10-26 ENCOUNTER — HOSPITAL ENCOUNTER (EMERGENCY)
Facility: HOSPITAL | Age: 47
Discharge: HOME | End: 2022-10-26
Attending: EMERGENCY MEDICINE
Payer: MEDICAID

## 2022-10-26 VITALS
SYSTOLIC BLOOD PRESSURE: 169 MMHG | TEMPERATURE: 99 F | RESPIRATION RATE: 17 BRPM | WEIGHT: 293 LBS | HEIGHT: 67 IN | OXYGEN SATURATION: 98 % | HEART RATE: 81 BPM | BODY MASS INDEX: 45.99 KG/M2 | DIASTOLIC BLOOD PRESSURE: 97 MMHG

## 2022-10-26 DIAGNOSIS — O03.9 MISCARRIAGE: Primary | ICD-10-CM

## 2022-10-26 DIAGNOSIS — I10 HYPERTENSION, UNSPECIFIED TYPE: ICD-10-CM

## 2022-10-26 LAB
ALBUMIN SERPL BCP-MCNC: 3.9 G/DL (ref 3.5–5.2)
ALP SERPL-CCNC: 59 U/L (ref 55–135)
ALT SERPL W/O P-5'-P-CCNC: 16 U/L (ref 10–44)
ANION GAP SERPL CALC-SCNC: 6 MMOL/L (ref 8–16)
AST SERPL-CCNC: 17 U/L (ref 10–40)
BASOPHILS # BLD AUTO: 0.03 K/UL (ref 0–0.2)
BASOPHILS NFR BLD: 0.3 % (ref 0–1.9)
BILIRUB SERPL-MCNC: 0.6 MG/DL (ref 0.1–1)
BUN SERPL-MCNC: 14 MG/DL (ref 6–20)
CALCIUM SERPL-MCNC: 8.6 MG/DL (ref 8.7–10.5)
CHLORIDE SERPL-SCNC: 106 MMOL/L (ref 95–110)
CO2 SERPL-SCNC: 24 MMOL/L (ref 23–29)
CREAT SERPL-MCNC: 0.7 MG/DL (ref 0.5–1.4)
DIFFERENTIAL METHOD: ABNORMAL
EOSINOPHIL # BLD AUTO: 0.1 K/UL (ref 0–0.5)
EOSINOPHIL NFR BLD: 1.1 % (ref 0–8)
ERYTHROCYTE [DISTWIDTH] IN BLOOD BY AUTOMATED COUNT: 17.9 % (ref 11.5–14.5)
EST. GFR  (NO RACE VARIABLE): >60 ML/MIN/1.73 M^2
GLUCOSE SERPL-MCNC: 98 MG/DL (ref 70–110)
HCG INTACT+B SERPL-ACNC: 1515 MIU/ML
HCT VFR BLD AUTO: 35.8 % (ref 37–48.5)
HGB BLD-MCNC: 10.8 G/DL (ref 12–16)
IMM GRANULOCYTES # BLD AUTO: 0.03 K/UL (ref 0–0.04)
IMM GRANULOCYTES NFR BLD AUTO: 0.3 % (ref 0–0.5)
LYMPHOCYTES # BLD AUTO: 2.6 K/UL (ref 1–4.8)
LYMPHOCYTES NFR BLD: 25.5 % (ref 18–48)
MCH RBC QN AUTO: 22.5 PG (ref 27–31)
MCHC RBC AUTO-ENTMCNC: 30.2 G/DL (ref 32–36)
MCV RBC AUTO: 75 FL (ref 82–98)
MONOCYTES # BLD AUTO: 0.6 K/UL (ref 0.3–1)
MONOCYTES NFR BLD: 5.6 % (ref 4–15)
NEUTROPHILS # BLD AUTO: 6.7 K/UL (ref 1.8–7.7)
NEUTROPHILS NFR BLD: 67.2 % (ref 38–73)
NRBC BLD-RTO: 0 /100 WBC
PLATELET # BLD AUTO: 297 K/UL (ref 150–450)
PMV BLD AUTO: 10.4 FL (ref 9.2–12.9)
POTASSIUM SERPL-SCNC: 3.5 MMOL/L (ref 3.5–5.1)
PROT SERPL-MCNC: 8.2 G/DL (ref 6–8.4)
RBC # BLD AUTO: 4.79 M/UL (ref 4–5.4)
SODIUM SERPL-SCNC: 136 MMOL/L (ref 136–145)
WBC # BLD AUTO: 10.02 K/UL (ref 3.9–12.7)

## 2022-10-26 PROCEDURE — 96374 THER/PROPH/DIAG INJ IV PUSH: CPT

## 2022-10-26 PROCEDURE — 80053 COMPREHEN METABOLIC PANEL: CPT | Performed by: PHYSICIAN ASSISTANT

## 2022-10-26 PROCEDURE — 99284 EMERGENCY DEPT VISIT MOD MDM: CPT

## 2022-10-26 PROCEDURE — 85025 COMPLETE CBC W/AUTO DIFF WBC: CPT | Performed by: PHYSICIAN ASSISTANT

## 2022-10-26 PROCEDURE — 25000003 PHARM REV CODE 250: Performed by: EMERGENCY MEDICINE

## 2022-10-26 PROCEDURE — 84702 CHORIONIC GONADOTROPIN TEST: CPT | Performed by: PHYSICIAN ASSISTANT

## 2022-10-26 PROCEDURE — 63600175 PHARM REV CODE 636 W HCPCS: Performed by: PHYSICIAN ASSISTANT

## 2022-10-26 PROCEDURE — 25000003 PHARM REV CODE 250: Performed by: PHYSICIAN ASSISTANT

## 2022-10-26 RX ORDER — HYDROCHLOROTHIAZIDE 25 MG/1
25 TABLET ORAL DAILY
Qty: 30 TABLET | Refills: 0 | Status: SHIPPED | OUTPATIENT
Start: 2022-10-26 | End: 2023-10-26

## 2022-10-26 RX ORDER — HYDROCODONE BITARTRATE AND ACETAMINOPHEN 5; 325 MG/1; MG/1
1 TABLET ORAL
Status: COMPLETED | OUTPATIENT
Start: 2022-10-26 | End: 2022-10-26

## 2022-10-26 RX ORDER — HYDROCHLOROTHIAZIDE 25 MG/1
25 TABLET ORAL
Status: COMPLETED | OUTPATIENT
Start: 2022-10-26 | End: 2022-10-26

## 2022-10-26 RX ORDER — KETOROLAC TROMETHAMINE 30 MG/ML
10 INJECTION, SOLUTION INTRAMUSCULAR; INTRAVENOUS
Status: COMPLETED | OUTPATIENT
Start: 2022-10-26 | End: 2022-10-26

## 2022-10-26 RX ORDER — LISINOPRIL 20 MG/1
40 TABLET ORAL
Status: COMPLETED | OUTPATIENT
Start: 2022-10-26 | End: 2022-10-26

## 2022-10-26 RX ORDER — HYDROCODONE BITARTRATE AND ACETAMINOPHEN 5; 325 MG/1; MG/1
1 TABLET ORAL EVERY 6 HOURS PRN
Qty: 12 TABLET | Refills: 0 | Status: SHIPPED | OUTPATIENT
Start: 2022-10-26 | End: 2022-10-29

## 2022-10-26 RX ADMIN — KETOROLAC TROMETHAMINE 10 MG: 30 INJECTION, SOLUTION INTRAMUSCULAR; INTRAVENOUS at 03:10

## 2022-10-26 RX ADMIN — HYDROCHLOROTHIAZIDE 25 MG: 25 TABLET ORAL at 05:10

## 2022-10-26 RX ADMIN — HYDROCODONE BITARTRATE AND ACETAMINOPHEN 1 TABLET: 5; 325 TABLET ORAL at 04:10

## 2022-10-26 RX ADMIN — LISINOPRIL 40 MG: 20 TABLET ORAL at 03:10

## 2022-10-26 NOTE — FIRST PROVIDER EVALUATION
Emergency Department TeleTriage Encounter Note      CHIEF COMPLAINT    Chief Complaint   Patient presents with    Vaginal Bleeding     Pt here last night and told she was having a miscarriage.  Bleeding is heavier today and increased weakness, pain and short of breath       VITAL SIGNS   Initial Vitals [10/26/22 1339]   BP Pulse Resp Temp SpO2   (!) 212/117 90 (!) 22 98.6 °F (37 °C) 100 %      MAP       --            ALLERGIES    Review of patient's allergies indicates:   Allergen Reactions    Aspirin        PROVIDER TRIAGE NOTE  This is a teletriage evaluation of a 47 y.o. female presenting to the ED with c/o continued, increased pelvic cramping with heavier vaginal bleeding- seen yesterday for incomplete miscarriage, given  METHERGINE Rx.  Hypertensive, no BP meds today.     PE:. Non-toxic/well-appearing. No respiratory distress, speaks in full sentences without issue. No active emesis nor cough. Normal eye contact and mentation.     Plan: repeat labs (hgb, bHcg), BP meds. Further/augmented workup at discretion of examining provider.     All ED beds are full at present; patient notified of this status.  Patient seen and medically screened by DEAN via teletriage. Orders initiated at triage to expedite care.  Patient is stable and will be placed in an ED bed when available.  Care will be transferred to an alternate provider when patient has been placed in an Exam Room further exam, additional orders, and disposition.         ORDERS  Labs Reviewed - No data to display    ED Orders (720h ago, onward)      None              Virtual Visit Note: The provider triage portion of this emergency department evaluation and documentation was performed via [employer], a HIPAA-compliant telemedicine application, in concert with a tele-presenter in the room. A face to face patient evaluation with one of my colleagues will occur once the patient is placed in an emergency department room.      DISCLAIMER: This note was prepared  with [employer] voice recognition transcription software. Garbled syntax, mangled pronouns, and other bizarre constructions may be attributed to that software system.

## 2022-10-26 NOTE — DISCHARGE INSTRUCTIONS
Next steps for you:  -Take methergine every 6 hours for 4 doses. We have prescribed this to you. You can take your next dose tomorrow morning after picking it up from the pharmacy.  -Call Dr. San's office tomorrow to figure out what time your clinic appointment is on Friday.   -Return to ER with worsening vaginal bleeding, fever, or any other symptoms concerning you

## 2022-10-26 NOTE — ED PROVIDER NOTES
Encounter Date: 10/26/2022       History     Chief Complaint   Patient presents with    Vaginal Bleeding     Pt here last night and told she was having a miscarriage.  Bleeding is heavier today and increased weakness, pain and short of breath     47-year-old female presents for evaluation of heavy vaginal bleeding with pelvic pain - paid described as menstrual cramping.  She was seen here yesterday for the same, diagnosed at that time with active miscarriage. She was treated with methergine after consultation with Dr. San and given follow-up on Friday.  She has been taking ibuprofen for the pain but states it is not relieving her pain much.  She is still passing blood.  She denies any lightheadedness, chest pain, shortness of breath or other signs of bleeding.  She has been tolerating PO.  Notably she contacted Dr. San's office and was told she can't be seen until February. No other acute issues.     The history is provided by the patient.   Review of patient's allergies indicates:   Allergen Reactions    Aspirin      Past Medical History:   Diagnosis Date    Anemia, unspecified     Hypertension     Obese body habitus      History reviewed. No pertinent surgical history.  History reviewed. No pertinent family history.     Review of Systems   Constitutional:  Negative for fever.   HENT:  Negative for rhinorrhea.    Respiratory:  Negative for cough and shortness of breath.    Cardiovascular:  Negative for chest pain.   Gastrointestinal:  Negative for abdominal pain, constipation, diarrhea, nausea and vomiting.   Genitourinary:  Positive for pelvic pain and vaginal bleeding. Negative for dysuria and flank pain.   Musculoskeletal:  Negative for back pain and neck pain.   Skin:  Negative for rash.   Neurological:  Negative for weakness, numbness and headaches.     Physical Exam     Initial Vitals [10/26/22 1339]   BP Pulse Resp Temp SpO2   (!) 212/117 90 (!) 22 98.6 °F (37 °C) 100 %      MAP       --          Physical Exam    Nursing note and vitals reviewed.  Constitutional: She appears well-developed and well-nourished. She is not diaphoretic. No distress.   HENT:   Head: Normocephalic and atraumatic.   Mouth/Throat: Oropharynx is clear and moist.   Eyes: Conjunctivae and EOM are normal.   Neck: Neck supple.   Normal range of motion.  Cardiovascular:  Normal rate and regular rhythm.           Pulmonary/Chest: Breath sounds normal. No respiratory distress. She has no wheezes. She has no rhonchi. She has no rales.   Abdominal: Abdomen is soft. Bowel sounds are normal. She exhibits no distension. There is no abdominal tenderness.   Genitourinary:    Genitourinary Comments: deferred     Musculoskeletal:         General: Normal range of motion.      Cervical back: Normal range of motion and neck supple.     Neurological: She is alert and oriented to person, place, and time. She has normal strength.   Skin: Skin is warm and dry. Capillary refill takes less than 2 seconds. No rash noted.   Psychiatric: She has a normal mood and affect.       ED Course   Procedures  Labs Reviewed   CBC W/ AUTO DIFFERENTIAL - Abnormal; Notable for the following components:       Result Value    Hemoglobin 10.8 (*)     Hematocrit 35.8 (*)     MCV 75 (*)     MCH 22.5 (*)     MCHC 30.2 (*)     RDW 17.9 (*)     All other components within normal limits    Narrative:     Release to patient->Immediate   COMPREHENSIVE METABOLIC PANEL - Abnormal; Notable for the following components:    Calcium 8.6 (*)     Anion Gap 6 (*)     All other components within normal limits    Narrative:     Release to patient->Immediate   HCG, QUANTITATIVE    Narrative:     Release to patient->Immediate   HCG, QUANTITATIVE          Imaging Results    None          Medications   ketorolac injection 9.999 mg (9.999 mg Intravenous Given 10/26/22 1521)   lisinopriL tablet 40 mg (40 mg Oral Given 10/26/22 1559)   HYDROcodone-acetaminophen 5-325 mg per tablet 1 tablet (1  tablet Oral Given 10/26/22 1641)   hydroCHLOROthiazide tablet 25 mg (25 mg Oral Given 10/26/22 1752)     Medical Decision Making:   Initial Assessment:   47-year-old , came in yesterday for vaginal bleeding which she assumed was her menstrual cycle, found to be pregnant.  Vaginal exam showed products of conception in the vaginal vault and patient diagnosed with active miscarriage.  She was given Methergine and follow-up this Friday with OBGYN.  She was given ibuprofen for pain but states this is not helping her pain.  Currently she is afebrile, hemodynamically stable, hypertensive.  She did not take her antihypertensive medication today.  It was given here.  Abdomen is soft and nonsurgical.  Labs obtained and are unremarkable, beta HCG is trending down.  I did review her ultrasound from yesterday.  There is no IUP identified, they were unable to visualize the ovaries, however the patient is not tender there, again she is hemodynamically stable with decreasing beta is and products of conception were found on exam yesterday.  She is simply here requesting better pain medication and wants to ensure she can follow up with OB on Friday.  She did contact the office and was told she cannot be seen until February.  I will give her Norco for pain.  I have contacted Dr. Pat regarding follow-up appointment.  Patient is to contact Dr. San's office tomorrow, let them know she has an appointment on Friday per Dr. San from the ED. I do not feel further emergent evaluation is warranted.  She is discharged in stable condition with strict return precautions.  Differential Diagnosis:   Miscarriage  Clinical Tests:   Lab Tests: Ordered and Reviewed  Radiological Study: Reviewed           ED Course as of 10/26/22 1818   Wed Oct 26, 2022   1725 Consulted Dr. San. [RB]    Dr. Pat answered the phone for Dr. San.  Appointment information as per [facility].  Patient's blood pressure has been elevated despite taking her  home dose of lisinopril 40 mg.  I gave her an additional dose of HCTZ 25 mg with improvement.  I will start her on this medication at home.  She advised to take all medicines as prescribed.  Return precautions discussed and she is discharged in stable condition [RB]      ED Course User Index  [RB] Batool Vaz MD                 Clinical Impression:   Final diagnoses:  [O03.9] Miscarriage (Primary)  [I10] Hypertension, unspecified type      ED Disposition Condition    Discharge Stable          ED Prescriptions       Medication Sig Dispense Start Date End Date Auth. Provider    HYDROcodone-acetaminophen (NORCO) 5-325 mg per tablet Take 1 tablet by mouth every 6 (six) hours as needed for Pain. 12 tablet 10/26/2022 10/29/2022 Batool Vaz MD    hydroCHLOROthiazide (HYDRODIURIL) 25 MG tablet Take 1 tablet (25 mg total) by mouth once daily. 30 tablet 10/26/2022 10/26/2023 Batool Vaz MD          Follow-up Information       Follow up With Specialties Details Why Contact Info Additional Information    [facility] - Emergency Dept Emergency Medicine  As needed, If symptoms worsen [address] [facility] [address]  [phone] 1st floor    Jun San MD Obstetrics and Gynecology  You have an appointment scheduled on Friday per Dr. San. Let the office know he wants to see you on Friday after an ED visit. [address] [facility]  [address] [facility] [address]  [phone]                Batool Vaz MD  10/26/22 1812

## 2022-11-04 ENCOUNTER — HOSPITAL ENCOUNTER (EMERGENCY)
Facility: HOSPITAL | Age: 47
Discharge: HOME | End: 2022-11-05
Attending: EMERGENCY MEDICINE
Payer: MEDICAID

## 2022-11-04 VITALS
HEIGHT: 67 IN | TEMPERATURE: 98 F | BODY MASS INDEX: 45.99 KG/M2 | HEART RATE: 95 BPM | WEIGHT: 293 LBS | OXYGEN SATURATION: 100 % | SYSTOLIC BLOOD PRESSURE: 164 MMHG | RESPIRATION RATE: 15 BRPM | DIASTOLIC BLOOD PRESSURE: 89 MMHG

## 2022-11-04 DIAGNOSIS — E86.0 DEHYDRATION: ICD-10-CM

## 2022-11-04 DIAGNOSIS — I10 HYPERTENSION, UNSPECIFIED TYPE: Primary | ICD-10-CM

## 2022-11-04 DIAGNOSIS — R55 PRE-SYNCOPE: ICD-10-CM

## 2022-11-04 LAB
ALBUMIN SERPL BCP-MCNC: 4.1 G/DL (ref 3.5–5.2)
ALP SERPL-CCNC: 63 U/L (ref 55–135)
ALT SERPL W/O P-5'-P-CCNC: 18 U/L (ref 10–44)
ANION GAP SERPL CALC-SCNC: 6 MMOL/L (ref 8–16)
AST SERPL-CCNC: 16 U/L (ref 10–40)
B-HCG UR QL: NEGATIVE
BACTERIA #/AREA URNS HPF: NEGATIVE /HPF
BASOPHILS # BLD AUTO: 0.02 K/UL (ref 0–0.2)
BASOPHILS NFR BLD: 0.2 % (ref 0–1.9)
BILIRUB SERPL-MCNC: 0.5 MG/DL (ref 0.1–1)
BILIRUB UR QL STRIP: NEGATIVE
BUN SERPL-MCNC: 21 MG/DL (ref 6–20)
CALCIUM SERPL-MCNC: 8.8 MG/DL (ref 8.7–10.5)
CHLORIDE SERPL-SCNC: 108 MMOL/L (ref 95–110)
CLARITY UR: CLEAR
CO2 SERPL-SCNC: 25 MMOL/L (ref 23–29)
COLOR UR: YELLOW
CREAT SERPL-MCNC: 0.9 MG/DL (ref 0.5–1.4)
CTP QC/QA: YES
DIFFERENTIAL METHOD: ABNORMAL
EOSINOPHIL # BLD AUTO: 0.1 K/UL (ref 0–0.5)
EOSINOPHIL NFR BLD: 0.8 % (ref 0–8)
ERYTHROCYTE [DISTWIDTH] IN BLOOD BY AUTOMATED COUNT: 18.1 % (ref 11.5–14.5)
EST. GFR  (NO RACE VARIABLE): >60 ML/MIN/1.73 M^2
GLUCOSE SERPL-MCNC: 99 MG/DL (ref 70–110)
GLUCOSE UR QL STRIP: NEGATIVE
HCT VFR BLD AUTO: 36.2 % (ref 37–48.5)
HGB BLD-MCNC: 10.9 G/DL (ref 12–16)
HGB UR QL STRIP: ABNORMAL
HYALINE CASTS #/AREA URNS LPF: 16 /LPF
IMM GRANULOCYTES # BLD AUTO: 0.04 K/UL (ref 0–0.04)
IMM GRANULOCYTES NFR BLD AUTO: 0.4 % (ref 0–0.5)
KETONES UR QL STRIP: NEGATIVE
LEUKOCYTE ESTERASE UR QL STRIP: ABNORMAL
LYMPHOCYTES # BLD AUTO: 2.7 K/UL (ref 1–4.8)
LYMPHOCYTES NFR BLD: 27.9 % (ref 18–48)
MCH RBC QN AUTO: 22.7 PG (ref 27–31)
MCHC RBC AUTO-ENTMCNC: 30.1 G/DL (ref 32–36)
MCV RBC AUTO: 75 FL (ref 82–98)
MICROSCOPIC COMMENT: ABNORMAL
MONOCYTES # BLD AUTO: 0.5 K/UL (ref 0.3–1)
MONOCYTES NFR BLD: 5.5 % (ref 4–15)
NEUTROPHILS # BLD AUTO: 6.4 K/UL (ref 1.8–7.7)
NEUTROPHILS NFR BLD: 65.2 % (ref 38–73)
NITRITE UR QL STRIP: NEGATIVE
NRBC BLD-RTO: 0 /100 WBC
PH UR STRIP: 6 [PH] (ref 5–8)
PLATELET # BLD AUTO: 380 K/UL (ref 150–450)
PMV BLD AUTO: 10.5 FL (ref 9.2–12.9)
POTASSIUM SERPL-SCNC: 3.7 MMOL/L (ref 3.5–5.1)
PROT SERPL-MCNC: 8.4 G/DL (ref 6–8.4)
PROT UR QL STRIP: ABNORMAL
RBC # BLD AUTO: 4.8 M/UL (ref 4–5.4)
RBC #/AREA URNS HPF: 7 /HPF (ref 0–4)
SODIUM SERPL-SCNC: 139 MMOL/L (ref 136–145)
SP GR UR STRIP: 1.03 (ref 1–1.03)
SQUAMOUS #/AREA URNS HPF: 5 /HPF
TROPONIN I SERPL DL<=0.01 NG/ML-MCNC: <0.03 NG/ML
URN SPEC COLLECT METH UR: ABNORMAL
UROBILINOGEN UR STRIP-ACNC: NEGATIVE EU/DL
WBC # BLD AUTO: 9.8 K/UL (ref 3.9–12.7)
WBC #/AREA URNS HPF: 3 /HPF (ref 0–5)

## 2022-11-04 PROCEDURE — 25000003 PHARM REV CODE 250: Performed by: NURSE PRACTITIONER

## 2022-11-04 PROCEDURE — 99285 EMERGENCY DEPT VISIT HI MDM: CPT | Mod: 25

## 2022-11-04 PROCEDURE — 80053 COMPREHEN METABOLIC PANEL: CPT | Performed by: NURSE PRACTITIONER

## 2022-11-04 PROCEDURE — 81001 URINALYSIS AUTO W/SCOPE: CPT | Performed by: NURSE PRACTITIONER

## 2022-11-04 PROCEDURE — 96361 HYDRATE IV INFUSION ADD-ON: CPT

## 2022-11-04 PROCEDURE — 93010 ELECTROCARDIOGRAM REPORT: CPT | Mod: ,,, | Performed by: INTERNAL MEDICINE

## 2022-11-04 PROCEDURE — 81025 URINE PREGNANCY TEST: CPT | Performed by: NURSE PRACTITIONER

## 2022-11-04 PROCEDURE — 96376 TX/PRO/DX INJ SAME DRUG ADON: CPT

## 2022-11-04 PROCEDURE — 96374 THER/PROPH/DIAG INJ IV PUSH: CPT

## 2022-11-04 PROCEDURE — 63600175 PHARM REV CODE 636 W HCPCS: Performed by: NURSE PRACTITIONER

## 2022-11-04 PROCEDURE — 93010 EKG 12-LEAD: ICD-10-PCS | Mod: ,,, | Performed by: INTERNAL MEDICINE

## 2022-11-04 PROCEDURE — 85025 COMPLETE CBC W/AUTO DIFF WBC: CPT | Performed by: NURSE PRACTITIONER

## 2022-11-04 PROCEDURE — 84484 ASSAY OF TROPONIN QUANT: CPT | Performed by: NURSE PRACTITIONER

## 2022-11-04 PROCEDURE — 93005 ELECTROCARDIOGRAM TRACING: CPT | Performed by: INTERNAL MEDICINE

## 2022-11-04 RX ORDER — METHYLERGONOVINE MALEATE 0.2 MG/1
0.2 TABLET ORAL EVERY 6 HOURS
COMMUNITY
Start: 2022-10-26 | End: 2022-11-04

## 2022-11-04 RX ORDER — HYDROCODONE BITARTRATE AND ACETAMINOPHEN 5; 325 MG/1; MG/1
1 TABLET ORAL EVERY 6 HOURS PRN
COMMUNITY

## 2022-11-04 RX ORDER — HYDROCHLOROTHIAZIDE 25 MG/1
25 TABLET ORAL
Status: COMPLETED | OUTPATIENT
Start: 2022-11-04 | End: 2022-11-04

## 2022-11-04 RX ORDER — HYDRALAZINE HYDROCHLORIDE 20 MG/ML
5 INJECTION INTRAMUSCULAR; INTRAVENOUS
Status: COMPLETED | OUTPATIENT
Start: 2022-11-04 | End: 2022-11-04

## 2022-11-04 RX ORDER — ACETAMINOPHEN 325 MG/1
650 TABLET ORAL
Status: COMPLETED | OUTPATIENT
Start: 2022-11-04 | End: 2022-11-04

## 2022-11-04 RX ADMIN — HYDROCHLOROTHIAZIDE 25 MG: 25 TABLET ORAL at 05:11

## 2022-11-04 RX ADMIN — SODIUM CHLORIDE 1000 ML: 9 INJECTION, SOLUTION INTRAVENOUS at 04:11

## 2022-11-04 RX ADMIN — ACETAMINOPHEN 650 MG: 325 TABLET ORAL at 09:11

## 2022-11-04 RX ADMIN — HYDRALAZINE HYDROCHLORIDE 5 MG: 20 INJECTION INTRAMUSCULAR; INTRAVENOUS at 08:11

## 2022-11-04 RX ADMIN — HYDRALAZINE HYDROCHLORIDE 5 MG: 20 INJECTION, SOLUTION INTRAMUSCULAR; INTRAVENOUS at 11:11

## 2022-11-04 RX ADMIN — HYDRALAZINE HYDROCHLORIDE 5 MG: 20 INJECTION, SOLUTION INTRAMUSCULAR; INTRAVENOUS at 09:11

## 2022-11-04 NOTE — ED PROVIDER NOTES
Encounter Date: 11/4/2022       History     Chief Complaint   Patient presents with    Pre Syncope     Patient states she felt weak and lightheaded today at work      Presents with complaint of weakness and dizziness.  Patient reports last week she had a miscarriage.  She has a history of anemia.  She denies nausea vomiting or diarrhea or fever.  She denies vaginal bleeding.  States she went back to work today and started feeling weak and dizzy.  She also presents with high blood pressure.  Patient has not been taking her blood pressure medicine as directed.  She states that she stopped taking it because her blood pressure was better.    Review of patient's allergies indicates:   Allergen Reactions    Aspirin      Past Medical History:   Diagnosis Date    Anemia, unspecified     Hypertension     Obese body habitus      No past surgical history on file.  No family history on file.     Review of Systems   Constitutional:  Negative for fever.   HENT:  Negative for trouble swallowing.    Respiratory:  Negative for cough, shortness of breath and wheezing.    Cardiovascular:  Negative for chest pain, palpitations and leg swelling.   Gastrointestinal:  Negative for abdominal pain, diarrhea, nausea and vomiting.   Genitourinary:  Negative for difficulty urinating, dysuria, frequency, hematuria, vaginal bleeding, vaginal discharge and vaginal pain.   Musculoskeletal:  Negative for back pain.   Skin:  Negative for rash.   Neurological:  Positive for dizziness and weakness. Negative for syncope and headaches.     Physical Exam     Initial Vitals [11/04/22 1422]   BP Pulse Resp Temp SpO2   (!) 193/88 89 18 98.1 °F (36.7 °C) 99 %      MAP       --         Physical Exam    Constitutional: She appears well-developed and well-nourished.   HENT:   Head: Normocephalic and atraumatic.   Mouth/Throat: Oropharynx is clear and moist.   Eyes: Conjunctivae are normal.   Neck: Neck supple.   Normal range of motion.  Cardiovascular:  Normal  rate, regular rhythm and normal heart sounds.           Pulmonary/Chest: Breath sounds normal. No respiratory distress. She has no wheezes. She has no rhonchi.   Abdominal: Abdomen is soft. Bowel sounds are normal. She exhibits no distension. There is no abdominal tenderness. There is no rebound and no guarding.   Genitourinary:    No vaginal discharge.      Genitourinary Comments: Os is closed.  Negative for cervical motion tenderness or adnexal tenderness.     Musculoskeletal:         General: Normal range of motion.      Cervical back: Normal range of motion and neck supple.      Comments: Patient is ambulatory per self without difficulty.     Neurological: She is alert and oriented to person, place, and time. No sensory deficit. GCS score is 15. GCS eye subscore is 4. GCS verbal subscore is 5. GCS motor subscore is 6.   Skin: Skin is warm and dry. Capillary refill takes less than 2 seconds.   Psychiatric: She has a normal mood and affect. Thought content normal.       ED Course   Procedures  Labs Reviewed   CBC W/ AUTO DIFFERENTIAL - Abnormal; Notable for the following components:       Result Value    Hemoglobin 10.9 (*)     Hematocrit 36.2 (*)     MCV 75 (*)     MCH 22.7 (*)     MCHC 30.1 (*)     RDW 18.1 (*)     All other components within normal limits   COMPREHENSIVE METABOLIC PANEL - Abnormal; Notable for the following components:    BUN 21 (*)     Anion Gap 6 (*)     All other components within normal limits   URINALYSIS, REFLEX TO URINE CULTURE - Abnormal; Notable for the following components:    Protein, UA Trace (*)     Occult Blood UA 1+ (*)     Leukocytes, UA Trace (*)     All other components within normal limits    Narrative:     Specimen Source->Urine   URINALYSIS MICROSCOPIC - Abnormal; Notable for the following components:    RBC, UA 7 (*)     Hyaline Casts, UA 16 (*)     All other components within normal limits    Narrative:     Specimen Source->Urine   TROPONIN I   POCT URINE PREGNANCY   POCT  GLUCOSE MONITORING CONTINUOUS        ECG Results              EKG 12-lead (In process)  Result time 11/04/22 14:29:51      In process by Interface, Lab In [facility] (11/04/22 14:29:51)                   Narrative:    Test Reason : R55,    Vent. Rate : 091 BPM     Atrial Rate : 091 BPM     P-R Int : 120 ms          QRS Dur : 090 ms      QT Int : 386 ms       P-R-T Axes : 067 022 -21 degrees     QTc Int : 474 ms    Normal sinus rhythm  Nonspecific ST and T wave abnormality  Prolonged QT  Abnormal ECG  No previous ECGs available    Referred By: [email or web address]   SELF           Confirmed By:                                   Imaging Results              CT Head Without Contrast (Final result)  Result time 11/04/22 21:30:20      Final result by Amos Alatorre MD (11/04/22 21:30:20)                   Narrative:    HISTORY:  Dizziness, non-specific; hypertension    TECHNIQUE:  Axial CT scan images of the brain obtained with sagittal and coronal reconstructions. Dose reduction techniques were employed including smart MA, use of patient weight and/or use of noise index.    COMPARISON:  None.    FINDINGS:  There is no evidence of acute intracranial hemorrhage or definite cortical infarction.    Hydrocephalus, midline shift or extra-axial fluid collection. Focal volume loss in the right coronary radiata is likely due to old lacunar infarction.  Basilar cisterns are patent.   The proximal M1 segments of the MCA's show no definite asymmetric hyperdensity.    No skull fracture.    The visualized paranasal sinuses and mastoid air cells are clear.    IMPRESSION:    No acute intracranial finding. If there is clinical concern for acute cortical ischemia, MRI of the brain can be obtained.    Electronically signed by:  Amos Alatorre MD  11/4/2022 9:30 PM CDT Workstation: [id number]                                     Medications   sodium chloride 0.9% bolus 1,000 mL (0 mLs Intravenous Stopped 11/4/22 2019)   hydroCHLOROthiazide tablet 25 mg  (25 mg Oral Given 11/4/22 1729)   hydrALAZINE injection 5 mg (5 mg Intravenous Given 11/4/22 2027)   acetaminophen tablet 650 mg (650 mg Oral Given 11/4/22 2158)   hydrALAZINE injection 5 mg (5 mg Intravenous Given 11/4/22 2154)   hydrALAZINE injection 5 mg (5 mg Intravenous Given 11/4/22 2308)     Medical Decision Making:   Initial Assessment:   Patient reports she had a miscarriage last week.  Went back to work today.  Left work feeling weak and dizzy.  Patient has a history of anemia and she currently takes iron tablets.  Clinical Tests:   Lab Tests: Reviewed  The following lab test(s) were unremarkable: CBC, CMP and Urinalysis       <> Summary of Lab: H&H is stable.  Blood in the urine is due to mild spotting.  Radiological Study: Reviewed  ED Management:  Patient was given 2 L of normal saline IV.  She was given a total of 20 mg of hydralazine for her elevated blood pressure.  At 1 point in her stay here in the ED should complained of a headache.  She was given Tylenol 650 p.o. with good results.  I ordered a CT head.  Patient refused.  I brought in an AMA form for her to sign for the CT head.  She then decided she needed it.  CT head was negative.  Her BUN here was 21.  H&H was stable at 10.9 and 36.2.  She denies headache at discharge.  Denies blurred vision at all.  Patient was very anxious and wanted to be discharged hours ago.  We continued to work on getting her blood pressure and control.  At discharge her blood pressure was 164/89.  She was totally asymptomatic at discharge.  She requested and was given a work excuse.  At no time while in the ED did this pt  ever appear in any acute distress.  Have discussed this pt with Dr. Lucas                         Clinical Impression:   Final diagnoses:  [R55] Pre-syncope  [E86.0] Dehydration  [I10] Hypertension, unspecified type (Primary)        ED Disposition Condition    Discharge Stable          ED Prescriptions    None       Follow-up Information     None          Ly Rizvi, WILL  11/05/22 0004

## 2022-11-04 NOTE — Clinical Note
"Ninoska Madridchristina Salguero was seen and treated in our emergency department on 11/4/2022.  She may return to work on 11/07/2022.       If you have any questions or concerns, please don't hesitate to call.      Ly Rizvi NP"

## 2022-11-05 NOTE — DISCHARGE INSTRUCTIONS
Someone will call you with a follow-up appointment with a primary care doctor.  In the meantime please take your high blood pressure medicine as directed.  Return to the ER for any worsening of symptoms or any other concerns.

## 2023-03-19 ENCOUNTER — HOSPITAL ENCOUNTER (EMERGENCY)
Facility: HOSPITAL | Age: 48
Discharge: HOME | End: 2023-03-19
Attending: STUDENT IN AN ORGANIZED HEALTH CARE EDUCATION/TRAINING PROGRAM
Payer: MEDICAID

## 2023-03-19 VITALS
DIASTOLIC BLOOD PRESSURE: 77 MMHG | SYSTOLIC BLOOD PRESSURE: 168 MMHG | HEART RATE: 71 BPM | OXYGEN SATURATION: 98 % | TEMPERATURE: 98 F | BODY MASS INDEX: 45.52 KG/M2 | RESPIRATION RATE: 18 BRPM | WEIGHT: 290 LBS | HEIGHT: 67 IN

## 2023-03-19 DIAGNOSIS — R06.02 SOB (SHORTNESS OF BREATH): Primary | ICD-10-CM

## 2023-03-19 DIAGNOSIS — R79.89 ELEVATED D-DIMER: ICD-10-CM

## 2023-03-19 DIAGNOSIS — R07.9 CHEST PAIN, UNSPECIFIED TYPE: ICD-10-CM

## 2023-03-19 DIAGNOSIS — I10 PRIMARY HYPERTENSION: ICD-10-CM

## 2023-03-19 DIAGNOSIS — R06.02 SHORTNESS OF BREATH: ICD-10-CM

## 2023-03-19 DIAGNOSIS — D50.9 MICROCYTIC ANEMIA: ICD-10-CM

## 2023-03-19 LAB
ALBUMIN SERPL BCP-MCNC: 3.7 G/DL (ref 3.5–5.2)
ALP SERPL-CCNC: 69 U/L (ref 55–135)
ALT SERPL W/O P-5'-P-CCNC: 20 U/L (ref 10–44)
ANION GAP SERPL CALC-SCNC: 14 MMOL/L (ref 8–16)
AST SERPL-CCNC: 17 U/L (ref 10–40)
B-HCG UR QL: NEGATIVE
BASOPHILS # BLD AUTO: 0.02 K/UL (ref 0–0.2)
BASOPHILS NFR BLD: 0.2 % (ref 0–1.9)
BILIRUB SERPL-MCNC: 0.4 MG/DL (ref 0.1–1)
BILIRUB UR QL STRIP: NEGATIVE
BNP SERPL-MCNC: 38 PG/ML (ref 0–99)
BUN SERPL-MCNC: 18 MG/DL (ref 6–20)
CALCIUM SERPL-MCNC: 8.7 MG/DL (ref 8.7–10.5)
CHLORIDE SERPL-SCNC: 106 MMOL/L (ref 95–110)
CLARITY UR: CLEAR
CO2 SERPL-SCNC: 16 MMOL/L (ref 23–29)
COLOR UR: YELLOW
CREAT SERPL-MCNC: 0.9 MG/DL (ref 0.5–1.4)
CTP QC/QA: YES
D DIMER PPP IA.FEU-MCNC: 1.14 MG/L FEU
DIFFERENTIAL METHOD: ABNORMAL
EOSINOPHIL # BLD AUTO: 0.2 K/UL (ref 0–0.5)
EOSINOPHIL NFR BLD: 1.9 % (ref 0–8)
ERYTHROCYTE [DISTWIDTH] IN BLOOD BY AUTOMATED COUNT: 17.7 % (ref 11.5–14.5)
EST. GFR  (NO RACE VARIABLE): >60 ML/MIN/1.73 M^2
GLUCOSE SERPL-MCNC: 99 MG/DL (ref 70–110)
GLUCOSE UR QL STRIP: NEGATIVE
HCT VFR BLD AUTO: 30 % (ref 37–48.5)
HGB BLD-MCNC: 9.1 G/DL (ref 12–16)
HGB UR QL STRIP: NEGATIVE
IMM GRANULOCYTES # BLD AUTO: 0.02 K/UL (ref 0–0.04)
IMM GRANULOCYTES NFR BLD AUTO: 0.2 % (ref 0–0.5)
INFLUENZA A, MOLECULAR: NEGATIVE
INFLUENZA B, MOLECULAR: NEGATIVE
KETONES UR QL STRIP: NEGATIVE
LEUKOCYTE ESTERASE UR QL STRIP: NEGATIVE
LYMPHOCYTES # BLD AUTO: 2.6 K/UL (ref 1–4.8)
LYMPHOCYTES NFR BLD: 31.2 % (ref 18–48)
MCH RBC QN AUTO: 20.8 PG (ref 27–31)
MCHC RBC AUTO-ENTMCNC: 30.3 G/DL (ref 32–36)
MCV RBC AUTO: 69 FL (ref 82–98)
MONOCYTES # BLD AUTO: 0.5 K/UL (ref 0.3–1)
MONOCYTES NFR BLD: 6.4 % (ref 4–15)
NEUTROPHILS # BLD AUTO: 5.1 K/UL (ref 1.8–7.7)
NEUTROPHILS NFR BLD: 60.1 % (ref 38–73)
NITRITE UR QL STRIP: NEGATIVE
NRBC BLD-RTO: 0 /100 WBC
PH UR STRIP: 7 [PH] (ref 5–8)
PLATELET # BLD AUTO: 324 K/UL (ref 150–450)
PMV BLD AUTO: 9.8 FL (ref 9.2–12.9)
POTASSIUM SERPL-SCNC: 3.6 MMOL/L (ref 3.5–5.1)
PROT SERPL-MCNC: 7.7 G/DL (ref 6–8.4)
PROT UR QL STRIP: ABNORMAL
RBC # BLD AUTO: 4.37 M/UL (ref 4–5.4)
SARS-COV-2 RDRP RESP QL NAA+PROBE: NEGATIVE
SODIUM SERPL-SCNC: 136 MMOL/L (ref 136–145)
SP GR UR STRIP: 1.03 (ref 1–1.03)
SPECIMEN SOURCE: NORMAL
TROPONIN I SERPL HS-MCNC: 5.4 PG/ML (ref 0–14.9)
URN SPEC COLLECT METH UR: ABNORMAL
UROBILINOGEN UR STRIP-ACNC: ABNORMAL EU/DL
WBC # BLD AUTO: 8.47 K/UL (ref 3.9–12.7)

## 2023-03-19 PROCEDURE — 85379 FIBRIN DEGRADATION QUANT: CPT | Performed by: STUDENT IN AN ORGANIZED HEALTH CARE EDUCATION/TRAINING PROGRAM

## 2023-03-19 PROCEDURE — U0002 COVID-19 LAB TEST NON-CDC: HCPCS | Performed by: STUDENT IN AN ORGANIZED HEALTH CARE EDUCATION/TRAINING PROGRAM

## 2023-03-19 PROCEDURE — 85025 COMPLETE CBC W/AUTO DIFF WBC: CPT | Performed by: STUDENT IN AN ORGANIZED HEALTH CARE EDUCATION/TRAINING PROGRAM

## 2023-03-19 PROCEDURE — 81003 URINALYSIS AUTO W/O SCOPE: CPT | Performed by: STUDENT IN AN ORGANIZED HEALTH CARE EDUCATION/TRAINING PROGRAM

## 2023-03-19 PROCEDURE — 99285 EMERGENCY DEPT VISIT HI MDM: CPT | Mod: 25

## 2023-03-19 PROCEDURE — 83880 ASSAY OF NATRIURETIC PEPTIDE: CPT | Performed by: STUDENT IN AN ORGANIZED HEALTH CARE EDUCATION/TRAINING PROGRAM

## 2023-03-19 PROCEDURE — 84484 ASSAY OF TROPONIN QUANT: CPT | Performed by: STUDENT IN AN ORGANIZED HEALTH CARE EDUCATION/TRAINING PROGRAM

## 2023-03-19 PROCEDURE — 25500020 PHARM REV CODE 255: Performed by: STUDENT IN AN ORGANIZED HEALTH CARE EDUCATION/TRAINING PROGRAM

## 2023-03-19 PROCEDURE — 93005 ELECTROCARDIOGRAM TRACING: CPT | Performed by: SPECIALIST

## 2023-03-19 PROCEDURE — 25000003 PHARM REV CODE 250: Performed by: STUDENT IN AN ORGANIZED HEALTH CARE EDUCATION/TRAINING PROGRAM

## 2023-03-19 PROCEDURE — 81025 URINE PREGNANCY TEST: CPT | Performed by: STUDENT IN AN ORGANIZED HEALTH CARE EDUCATION/TRAINING PROGRAM

## 2023-03-19 PROCEDURE — 80053 COMPREHEN METABOLIC PANEL: CPT | Performed by: STUDENT IN AN ORGANIZED HEALTH CARE EDUCATION/TRAINING PROGRAM

## 2023-03-19 PROCEDURE — 93010 ELECTROCARDIOGRAM REPORT: CPT | Mod: ,,, | Performed by: SPECIALIST

## 2023-03-19 PROCEDURE — 87502 INFLUENZA DNA AMP PROBE: CPT | Performed by: STUDENT IN AN ORGANIZED HEALTH CARE EDUCATION/TRAINING PROGRAM

## 2023-03-19 PROCEDURE — 93010 EKG 12-LEAD: ICD-10-PCS | Mod: ,,, | Performed by: SPECIALIST

## 2023-03-19 RX ORDER — ACETAMINOPHEN 500 MG
1000 TABLET ORAL
Status: COMPLETED | OUTPATIENT
Start: 2023-03-19 | End: 2023-03-19

## 2023-03-19 RX ADMIN — ACETAMINOPHEN 1000 MG: 500 TABLET ORAL at 02:03

## 2023-03-19 RX ADMIN — IOHEXOL 100 ML: 350 INJECTION, SOLUTION INTRAVENOUS at 04:03

## 2023-03-19 NOTE — ED PROVIDER NOTES
Encounter Date: 3/19/2023       History     Chief Complaint   Patient presents with    Shortness of Breath     Pt complains of substernal chest pain and Shortness of breath that started three days ago. Pt is not complaining of chest pain at this time but is still feeling SOB and having generalized weakness.      HPI    Ms. Salguero is a 48-year-old female with past medical history of hypertension, iron deficiency anemia on iron supplementation, presenting with several days of shortness of breath and substernal pain associated with some myalgias, chills and generalized malaise.  She has missed work for the last couple days is specifically asking for a work note on my initial exam.  She denies any known sick contacts, leg swelling or history of blood clots.     Review of patient's allergies indicates:   Allergen Reactions    Aspirin      Past Medical History:   Diagnosis Date    Anemia, unspecified     Hypertension     Obese body habitus      No past surgical history on file.  No family history on file.     Review of Systems   Constitutional:  Positive for chills. Negative for fever.   Respiratory:  Positive for cough, chest tightness and shortness of breath.    Cardiovascular:  Positive for chest pain. Negative for palpitations and leg swelling.   Musculoskeletal:  Positive for myalgias.     Physical Exam     Initial Vitals [03/19/23 1323]   BP Pulse Resp Temp SpO2   (!) 190/86 86 18 98.1 °F (36.7 °C) 97 %      MAP       --         Physical Exam    Nursing note and vitals reviewed.  Constitutional: She appears well-developed and well-nourished.   HENT:   Head: Normocephalic and atraumatic.   Eyes: EOM are normal. Pupils are equal, round, and reactive to light.   Neck:   Normal range of motion.  Cardiovascular:  Normal heart sounds and intact distal pulses.           Pulmonary/Chest: Breath sounds normal. No respiratory distress.   Abdominal: Abdomen is soft. Bowel sounds are normal.   Musculoskeletal:         General:  No tenderness. Normal range of motion.      Cervical back: Normal range of motion.     Neurological: She is alert and oriented to person, place, and time.   Skin: Skin is warm and dry.   Psychiatric: She has a normal mood and affect. Thought content normal.       ED Course   Procedures  Labs Reviewed   CBC W/ AUTO DIFFERENTIAL - Abnormal; Notable for the following components:       Result Value    Hemoglobin 9.1 (*)     Hematocrit 30.0 (*)     MCV 69 (*)     MCH 20.8 (*)     MCHC 30.3 (*)     RDW 17.7 (*)     All other components within normal limits   COMPREHENSIVE METABOLIC PANEL - Abnormal; Notable for the following components:    CO2 16 (*)     All other components within normal limits   D DIMER, QUANTITATIVE - Abnormal; Notable for the following components:    D-Dimer 1.14 (*)     All other components within normal limits    Narrative:     ddimr critical result(s) repeated. Called and verbal readback   obtained from Aurelio Farmer RN by LS1 03/19/2023 14:47  Recoll. [id number] by LS1 at 03/19/2023 14:04, reason: qns--waiting   for recollect   URINALYSIS, REFLEX TO URINE CULTURE - Abnormal; Notable for the following components:    Protein, UA Trace (*)     Urobilinogen, UA 2.0-3.0 (*)     All other components within normal limits    Narrative:     Specimen Source->Urine   TROPONIN I HIGH SENSITIVITY   B-TYPE NATRIURETIC PEPTIDE   INFLUENZA A AND B ANTIGEN    Narrative:     Specimen Source->Nasopharyngeal Swab   SARS-COV-2 RNA AMPLIFICATION, QUAL   POCT URINE PREGNANCY        ECG Results              EKG 12-lead (In process)  Result time 03/19/23 14:18:18      In process by Interface, Lab In [facility] (03/19/23 14:18:18)                   Narrative:    Test Reason : R06.02,    Vent. Rate : 090 BPM     Atrial Rate : 090 BPM     P-R Int : 118 ms          QRS Dur : 100 ms      QT Int : 390 ms       P-R-T Axes : 066 034 -03 degrees     QTc Int : 477 ms    Normal sinus rhythm  Nonspecific ST and T wave  abnormality  Abnormal ECG  When compared with ECG of 04-NOV-2022 14:28,  No significant change was found    Referred By: [email or web address]   SELF           Confirmed By:                                   Imaging Results              CTA Chest Non-Coronary (PE Studies) (Final result)  Result time 03/19/23 18:33:56      Final result by Tracey Pedro DO (03/19/23 18:33:56)                   Narrative:    CT ANGIOGRAM CHEST WITH IV CONTRAST: 3/19/2023 4:35 PM CDT    HISTORY: 48 years  old Female with Pulmonary embolism (PE) suspected, positive D-dimer.    TECHNIQUE:  Postcontrast CT through the chest was performed per protocol for CT angiography.  3D Multiplanar reformations were performed at the workstation. Reconstructed sagittal and coronal images were also obtained through the chest. This exam was performed according to our departmental dose-optimization program, which includes automated exposure control, adjustment of the mA and/or KV according to the patient's size and/or use of iterative reconstruction technique.    COMPARISON: None available    FINDINGS:    CARDIOMEDIASTINAL STRUCTURES: The heart size is enlarged. No pericardial effusion is seen. No gross coronary artery calcifications are seen.    LYMPH NODES: No significant mediastinal, supraclavicular, or axillary lymphadenopathy is seen.    AORTA: The thoracic aorta is normal in size.    PULMONARY ARTERY: The main pulmonary artery is normal in size. There are no findings to suggest a pulmonary embolism. The distal segmental arteries are not well opacified.    THYROID: The thyroid gland is unremarkable.    TRACHEA: The central tracheobronchial tree is patent.    PARENCHYMA: There are groundglass changes within the lungs. These are also within them is a distribution with some air trapping present.    PLEURA: No discrete pleural effusions are seen.  There is no evidence of a pneumothorax.    BONES AND SOFT TISSUES: No acute osseous abnormality is seen.    UPPER  ABDOMEN: Evaluation of the upper abdomen is limited by the arterial phase. The visualized hepatic parenchyma is mildly low in attenuation.    IMPRESSION:  No filling defect is seen to suggest a pulmonary artery embolism.    There are mosaic groundglass changes seen within the lungs bilaterally. The    Hepatic steatosis      Electronically signed by:  Tracey Pedro DO  3/19/2023 6:33 PM CDT Workstation: [id number]                                     X-Ray Chest AP Portable (Final result)  Result time 03/19/23 14:06:33      Final result by Edvin Rose MD (03/19/23 14:06:33)                   Narrative:    Reason: CHF    FINDINGS:    Portable chest without comparisons show normal cardiomediastinal silhouette.  Lungs are clear. Pulmonary vasculature is normal. No acute osseous abnormality.    IMPRESSION:    No acute cardiopulmonary abnormality.    Electronically signed by:  Edvin Rose DO  3/19/2023 2:06 PM CDT Workstation: [id number]                                     Medications   acetaminophen tablet 1,000 mg (1,000 mg Oral Given 3/19/23 1423)   iohexoL (OMNIPAQUE 350) injection 100 mL (100 mLs Intravenous Given 3/19/23 1610)           MDM    Assessment:  48-year-old female with past medical history of hypertension, iron deficiency anemia on iron supplementation, presenting with shortness of breath, chest pain now resolved on time of presentation, generalized malaise and myalgias for 3 days.  Also endorses a generalized headache, she took Aleve earlier with some relief.  She is hypertensive but afebrile and vitals are otherwise stable.  Her exam is benign.  Ddx:  Most concerning for viral syndrome, ACS, new onset heart failure, PE, DVT, symptomatic anemia.  Workup:  Basic labs and cardiac workup reviewed, high sensitivity troponin undetectable BN P within normal limits.  She has a microcytic anemia that is around her baseline, electrolytes within normal limits.  Chest x-ray reviewed, no focal opacities or  effusions per my read.  She is negative for COVID and flu.  UA without signs of infection, she has trace protein consistent with chronically elevated blood pressure  D-dimer obtained an elevated 1.14.  CT PE study ordered, does not have any filling deficits or evidence of PE, but does have some bilateral ground-glass opacities possibly concerning for viral bronchitis or pneumonia.  Tylenol given for headache.  Dispo:   Initially she did not want to stay for her PE study results, then they were read shortly prior to discharge.  She is stable for ED  discharge with primary care follow-up and ED return precautions if symptoms worsen or do improve.  Work note provided as requested.      Denise Adair MD  9:13 PM 03/19/2023               ED Course as of 03/19/23 2120   Sun Mar 19, 2023   1354 Preg Test, Ur: Negative [CH]   1354 BP(!): 190/86 [CH]   1505 Troponin I High Sensitivity: 5.4 [CH]   1505 BNP: 38 [CH]   1554 D-dimer elevated, CT PE study ordered, I discussed this with patient and she agreed. [CH]   1554 D-Dimer(!!): 1.14 [CH]   1830 Went to re-evaluate patient, she desires to go, I instructed her that her CT results were not read yet and I would call her with the results but I did not see a large blood clot.  Will discharge from ED pending CT results.  Work note provided as requested [CH]   2113 Influenza A, Molecular: Negative [CH]   2113 Influenza B, Molecular: Negative [CH]   2113 Flu A & B Source: Nasal swab [CH]   2113 SARS-CoV-2 RNA, Amplification, Qual: Negative [CH]   2115 EKG normal sinus rhythm 90 beats per minute.  Does have T-wave flattening in 3 and AVF, biphasic T-waves in V1, unchanged from prior EKG in November 2022 [CH]      ED Course User Index  [CH] Denise Adair MD                 Clinical Impression:   Final diagnoses:  [R06.02] Shortness of breath  [R06.02] SOB (shortness of breath) (Primary)  [I10] Primary hypertension  [R07.9] Chest pain, unspecified type  [R79.89] Elevated  d-dimer  [D50.9] Microcytic anemia        ED Disposition Condition    Discharge Stable          ED Prescriptions    None       Follow-up Information       Follow up With Specialties Details Why Contact Info Additional Information    [facility] - Emergency Dept Emergency Medicine Go to  As needed, If symptoms worsen [address] [facility] [id number]  [phone] 1st floor             Denise Adair MD  03/19/23 9225

## 2023-03-19 NOTE — Clinical Note
"Ninoska Madridchristina Salguero was seen and treated in our emergency department on 3/19/2023.  She may return to work on 03/21/2023.       If you have any questions or concerns, please don't hesitate to call.      Denise Adair MD"

## 2023-03-19 NOTE — DISCHARGE INSTRUCTIONS
Your D-dimer test was elevated and your CT results were pending at time of discharge.  I did not see evidence of a large blood clot of the radiologist has not read the study.  If I see a blood clot, I will give you a call to return to the ED for further evaluation.

## 2023-04-11 ENCOUNTER — PATIENT MESSAGE (OUTPATIENT)
Dept: RESEARCH | Facility: HOSPITAL | Age: 48
End: 2023-04-11
Payer: MEDICAID

## 2023-08-20 ENCOUNTER — HOSPITAL ENCOUNTER (EMERGENCY)
Facility: HOSPITAL | Age: 48
Discharge: HOME | End: 2023-08-20
Attending: EMERGENCY MEDICINE
Payer: MEDICAID

## 2023-08-20 VITALS
RESPIRATION RATE: 18 BRPM | SYSTOLIC BLOOD PRESSURE: 151 MMHG | TEMPERATURE: 99 F | BODY MASS INDEX: 45.99 KG/M2 | DIASTOLIC BLOOD PRESSURE: 85 MMHG | HEIGHT: 67 IN | HEART RATE: 83 BPM | WEIGHT: 293 LBS | OXYGEN SATURATION: 96 %

## 2023-08-20 DIAGNOSIS — U07.1 COVID-19: Primary | ICD-10-CM

## 2023-08-20 DIAGNOSIS — U07.1 COVID-19 VIRUS DETECTED: ICD-10-CM

## 2023-08-20 LAB
GROUP A STREP, MOLECULAR: NEGATIVE
INFLUENZA A, MOLECULAR: NEGATIVE
INFLUENZA B, MOLECULAR: NEGATIVE
SARS-COV-2 RDRP RESP QL NAA+PROBE: POSITIVE
SPECIMEN SOURCE: NORMAL

## 2023-08-20 PROCEDURE — 87502 INFLUENZA DNA AMP PROBE: CPT | Performed by: EMERGENCY MEDICINE

## 2023-08-20 PROCEDURE — U0002 COVID-19 LAB TEST NON-CDC: HCPCS | Performed by: EMERGENCY MEDICINE

## 2023-08-20 PROCEDURE — 99283 EMERGENCY DEPT VISIT LOW MDM: CPT

## 2023-08-20 PROCEDURE — 87651 STREP A DNA AMP PROBE: CPT | Performed by: EMERGENCY MEDICINE

## 2023-08-20 PROCEDURE — 25000003 PHARM REV CODE 250: Performed by: STUDENT IN AN ORGANIZED HEALTH CARE EDUCATION/TRAINING PROGRAM

## 2023-08-20 RX ADMIN — IBUPROFEN 600 MG: 200 TABLET, FILM COATED ORAL at 05:08

## 2023-08-20 NOTE — DISCHARGE INSTRUCTIONS
Please return to the emergency department if he experienced nausea, vomiting with inability to eat or drink chest pain, shortness of breath, the worst headache of your life.  He has been diagnosed with COVID-19.  Please try to home quarantine if you remain to be symptomatic.

## 2023-08-20 NOTE — Clinical Note
"Ninoska Madridchristina Salguero was seen and treated in our emergency department on 8/20/2023.  She may return to work on 08/26/2023.  COVID +, isolate/quarantine for at least 5 days     If you have any questions or concerns, please don't hesitate to call.      Jayden Moreno MD"

## 2023-08-20 NOTE — ED PROVIDER NOTES
Encounter Date: 8/20/2023       History     Chief Complaint   Patient presents with    Generalized Body Aches    Sore Throat     Patient c/o body aches and sore throat Saturday     HPI    48-year-old female with complaint of generalized myalgias, sore throat, headache and subjective fever that has been present for the past day.  She also reports Patient does endorse recent sick contact stating that she was exposed while at work.  Denies any shortness a breath or chest pain.  Review of patient's allergies indicates:   Allergen Reactions    Aspirin      Past Medical History:   Diagnosis Date    Anemia, unspecified     Hypertension     Obese body habitus      No past surgical history on file.  No family history on file.     Review of Systems   Constitutional:  Negative for fever.   HENT:  Positive for congestion. Negative for sore throat.    Respiratory:  Positive for cough. Negative for shortness of breath.    Cardiovascular:  Negative for chest pain.   Gastrointestinal:  Negative for nausea.   Genitourinary:  Negative for dysuria.   Musculoskeletal:  Negative for back pain.   Skin:  Negative for rash.   Neurological:  Negative for weakness.   Hematological:  Does not bruise/bleed easily.       Physical Exam     Initial Vitals [08/20/23 0310]   BP Pulse Resp Temp SpO2   (!) 209/103 83 18 99.4 °F (37.4 °C) 96 %      MAP       --         Physical Exam    Nursing note and vitals reviewed.  Constitutional: She appears well-developed and well-nourished.   HENT:   Head: Normocephalic and atraumatic.   Eyes: EOM are normal. Pupils are equal, round, and reactive to light.   Ptosis noted towards right eye, patient states that this is her baseline ever since being diagnosed with Bell's palsy in 2011   Neck: Neck supple.   Normal range of motion.  Cardiovascular:  Normal rate and regular rhythm.           Pulmonary/Chest: Breath sounds normal.   Abdominal: Abdomen is soft. There is no abdominal tenderness.   Musculoskeletal:       Cervical back: Normal range of motion and neck supple.     Neurological:   Right-sided facial droop (baseline since Bell's palsy diagnosis)   Skin: Skin is warm and dry.         ED Course   Procedures  Labs Reviewed   SARS-COV-2 RNA AMPLIFICATION, QUAL - Abnormal; Notable for the following components:       Result Value    SARS-CoV-2 RNA, Amplification, Qual Positive (*)     All other components within normal limits   GROUP A STREP, MOLECULAR   INFLUENZA A AND B ANTIGEN    Narrative:     Specimen Source->Nasopharyngeal Swab          Imaging Results    None          Medications   ibuprofen tablet 600 mg (600 mg Oral Given 8/20/23 0509)     Medical Decision Making  48-year-old female presents to the emergency department with complaint of cold-like symptoms have been present for the past day.  Endorses cough, congestion, myalgias, subjective fever.  Initial vital signs with elevated blood pressure in the 200 systolic, on repeat blood pressure significantly improved at 151/85.  Patient states that she took her blood pressure medication shortly prior to arrival.  While in triage patient was noted to be COVID positive.  Discussed with patient lab results as well as plan to discharge to home at this time.  Patient was given a dose of Motrin while here.  Carefully reviewed return precautions.  Patient understands and agrees with plan of care.  Case was discussed with Dr. Elkins.    Jayden Moreno MD  [facility], PGY-3     Amount and/or Complexity of Data Reviewed  Labs: ordered. Decision-making details documented in ED Course.               ED Course as of 08/20/23 0519   Sun Aug 20, 2023   0450 SARS-CoV-2 RNA, Amplification, Qual(!!): Positive [SB]      ED Course User Index  [SB] Jayden Moreno MD                    Clinical Impression:   Final diagnoses:  [U07.1] COVID-19 (Primary)        ED Disposition Condition    Discharge Stable          ED Prescriptions    None       Follow-up Information       Follow up  With Specialties Details Why Contact Info Additional Information    [facility]  If symptoms worsen [address]  [facility] [id number] [phone] 1st floor    [facility]   To establish primary care [facility] [facility] [id number]  [phone]                Jayden Moreno MD  Resident  08/20/23 0519

## 2024-01-01 ENCOUNTER — HOSPITAL ENCOUNTER (EMERGENCY)
Facility: HOSPITAL | Age: 49
Discharge: HOME | End: 2024-01-01
Attending: EMERGENCY MEDICINE
Payer: MEDICAID

## 2024-01-01 VITALS
HEIGHT: 67 IN | TEMPERATURE: 99 F | SYSTOLIC BLOOD PRESSURE: 180 MMHG | WEIGHT: 293 LBS | HEART RATE: 95 BPM | RESPIRATION RATE: 18 BRPM | OXYGEN SATURATION: 97 % | BODY MASS INDEX: 45.99 KG/M2 | DIASTOLIC BLOOD PRESSURE: 103 MMHG

## 2024-01-01 DIAGNOSIS — J10.1 INFLUENZA B: ICD-10-CM

## 2024-01-01 DIAGNOSIS — R05.1 ACUTE COUGH: Primary | ICD-10-CM

## 2024-01-01 LAB
GROUP A STREP, MOLECULAR: NEGATIVE
INFLUENZA A, MOLECULAR: NEGATIVE
INFLUENZA B, MOLECULAR: POSITIVE
SARS-COV-2 RDRP RESP QL NAA+PROBE: NEGATIVE
SPECIMEN SOURCE: ABNORMAL

## 2024-01-01 PROCEDURE — 99282 EMERGENCY DEPT VISIT SF MDM: CPT

## 2024-01-01 PROCEDURE — U0002 COVID-19 LAB TEST NON-CDC: HCPCS | Performed by: NURSE PRACTITIONER

## 2024-01-01 PROCEDURE — 87502 INFLUENZA DNA AMP PROBE: CPT | Performed by: NURSE PRACTITIONER

## 2024-01-01 PROCEDURE — 87651 STREP A DNA AMP PROBE: CPT | Performed by: NURSE PRACTITIONER

## 2024-01-01 RX ORDER — PROMETHAZINE HYDROCHLORIDE AND DEXTROMETHORPHAN HYDROBROMIDE 6.25; 15 MG/5ML; MG/5ML
5 SYRUP ORAL EVERY 6 HOURS PRN
Qty: 100 ML | Refills: 0 | Status: SHIPPED | OUTPATIENT
Start: 2024-01-01 | End: 2024-01-06

## 2024-01-01 NOTE — Clinical Note
"Ninoska Madridchristina Salguero was seen and treated in our emergency department on 1/1/2024.  She may return to work on 01/04/2024.       If you have any questions or concerns, please don't hesitate to call.      Batool Nicole, WILL"

## 2024-01-02 NOTE — ED PROVIDER NOTES
"Source of History:  Patient, chart    Chief complaint:  Cough (Sore throat and cough since friday)      HPI:  Ninoska Salguero is a 48 y.o. female with medical history of hypertension, obesity presenting with cough, sore throat since Friday.  Pt has been taking OTC medications but wants to make sure she does not have Strep throat.      This is the extent to the patients complaints today here in the emergency department.    ROS: As per HPI and below:  Constitutional: Positive for fever  Eyes: No discharge  ENT: Positive for sore throat.   Respiratory: No difficulty breathing. Positive for cough  Abdomen: No abdominal pain.   Genito-Urinary: No abnormal urination.  Neurologic: No weakness.   MSK: no injuries  Integument: No rashes or lesions.  Hematologic: No easy bruising.  Endocrine: No excessive thirst or urination.    Review of patient's allergies indicates:   Allergen Reactions    Aspirin        PMH:  As per HPI and below:  Past Medical History:   Diagnosis Date    Anemia, unspecified     Hypertension     Obese body habitus      No past surgical history on file.         Physical Exam:    BP (!) 180/103   Pulse 95   Temp 99.4 °F (37.4 °C)   Resp 18   Ht 5' 7" (1.702 m)   Wt (!) 149.2 kg (329 lb)   LMP 11/30/2023 (Approximate)   SpO2 97%   BMI 51.53 kg/m²   Nursing note and vital signs reviewed.  Constitutional: No acute distress. Body mass index is 51.53 kg/m².   Nonfebrile and nontoxic appearing.    Eyes: No conjunctival injection.  Moist eyes with good tear production.  Extraocular muscles are intact.  ENT: Oropharynx clear.  Moist mucous membranes.  Tonsils 2+ with no exudate, not kissing, no asymmetry, uvula midline, mild- moderate erythema    Cardiovascular: Regular rate and rhythm. No murmurs, gallops or rubs.  Respiratory: Clear to auscultation bilaterally.  Good air movement.  No wheezes.  No rhonchi. No rales. No accessory muscle use.  Abdomen: Soft.  Not distended.  Nontender.  No guarding.  No " rebound. Non-peritoneal.  Musculoskeletal: Good range of motion all joints.  No deformities.  Neck supple.  No meningismus.  Skin: No rashes seen.  Good turgor.  No abrasions.  No ecchymoses.  Neurologic: Motor intact and moving all extremities.  No focal neurological deficits  Mental Status:  Alert.  Appropriate for age.    MDM    Emergent evaluation of an obese 47 yo female presenting for cough, congestion, sore throat and fever since Friday.  Patient states she has been taking over-the-counter medications with minimal relief.  Patient concerned for strep throat.  On exam pt is A&Ox3. VSS. Nonfebrile and nontoxic appearing.  Mucous membranes pink and moist. Tonsils with no redness, erythema or exudates. Breath sounds clear bilaterally.  Abdomen soft and nontender. No rebound or guarding appreciated on exam.   BS WNL.  Pt speaking in full sentences.  Steady gait appreciated. Cap refill < 3 seconds.      History Acquisition   Additional history was acquired from other historians.  Chart    The patient's list of active medical problems, social history, medications, and allergies as documented per RN staff has been reviewed.     Differential Diagnoses   Based on available information and the initial assessment, the working differential diagnoses considered during this evaluation include but are not limited to COVID, flu, strep, viral illness, others.    I will get swabs and reassess.  I discussed this case with my supervising physician      LABS     Labs Reviewed   INFLUENZA A AND B ANTIGEN - Abnormal; Notable for the following components:       Result Value    Influenza B, Molecular Positive (*)     All other components within normal limits    Narrative:     Specimen Source->Nasopharyngeal Swab  Influenza B critical result(s) called and verbal readback obtained   from Marc Bailey RN in ED  by KS3 01/01/2024 23:06   GROUP A STREP, MOLECULAR   SARS-COV-2 RNA AMPLIFICATION, QUAL     Additional Consideration   All  available testing was considered during the course of this workup.  Comorbidities taken into consideration during the patient's evaluation and treatment include weight, age.    Social determinants of health were taken into consideration during development of our treatment plan.    Medications - No data to display   ED Course as of 01/01/24 2325 Mon Jan 01, 2024   2311 Patient Influenza B positive.  Patient reassessed.  Patient updated on results.  Advised to increase fluids and rest.  Rotate Tylenol and ibuprofen as needed for fever and body aches.  Work note given.  Strict return to ED precautions discussed.  Patient verbalized understanding of this plan of care.  All questions and concerns addressed.   [RZ]   2314 Patient is hemodynamically stable, vital signs are normal. Discharge instructions given. Return to ED precautions discussed. Follow up as directed. Pt verbalized understanding of this plan.  Pt is stable for discharge.  [RZ]      ED Course User Index  [RZ] Batool Nicole NP             CLINICAL IMPRESSION  1. Acute cough    2. Influenza B         ED Disposition Condition    Discharge Stable            Instruction:  I see no indication of an emergent process beyond that addressed during our encounter but have duly counseled the patient/family regarding the need for prompt follow-up as well as the indications that should prompt immediate return to the emergency room should new or worrisome developments occur.  The patient/family has been provided with verbal and printed direction regarding our final diagnosis(es) as well as instructions regarding use of OTC and/or Rx medications intended to manage the patient's aforementioned conditions including:  ED Prescriptions       Medication Sig Dispense Start Date End Date Auth. Provider    promethazine-dextromethorphan (PROMETHAZINE-DM) 6.25-15 mg/5 mL Syrp Take 5 mLs by mouth every 6 (six) hours as needed. 100 mL 1/1/2024 1/6/2024 Batool Nicole NP           Patient has been advised of following recommended follow-up instructions:  Follow-up Information       Follow up With Specialties Details Why Contact Info    PCP  Schedule an appointment as soon as possible for a visit  As needed           The patient/family communicates understanding of all this information and all remaining questions and concerns were addressed at this time.      The patient's condition did not warrant review of the  and prescription of controlled substances.      This note was created using dictation software.  This program may occasionally mistype words and phrases.         Batool Nicole NP  01/01/24 6716

## 2024-01-02 NOTE — DISCHARGE INSTRUCTIONS
You were seen and evaluated in the ER today.  You are positive for Influenza B.  You can continue to rotate Tylenol and ibuprofen for fever body aches.  Increase fluids and rest.  Take all medications as prescribed.  You can return to work in 5 days from symptom onset or 24 hours fever free.  Please follow-up with your PCP as needed.  Please return to the ED for any worsening symptoms such as chest pain, shortness of breath, fever not controlled with Tylenol or ibuprofen or uncontrolled pain.      Our goal in the emergency department is to always give you outstanding care and exceptional service. You may receive a survey by mail or e-mail in the next week regarding your experience in our ED. We would greatly appreciate your completing and returning the survey. Your feedback provides us with a way to recognize our staff who give very good care and it helps us learn how to improve when your experience was below our aspiration of excellence.

## 2024-03-20 ENCOUNTER — HOSPITAL ENCOUNTER (EMERGENCY)
Facility: HOSPITAL | Age: 49
Discharge: HOME | End: 2024-03-20
Attending: EMERGENCY MEDICINE
Payer: MEDICAID

## 2024-03-20 VITALS
DIASTOLIC BLOOD PRESSURE: 57 MMHG | TEMPERATURE: 99 F | OXYGEN SATURATION: 99 % | BODY MASS INDEX: 45.99 KG/M2 | HEIGHT: 67 IN | SYSTOLIC BLOOD PRESSURE: 136 MMHG | RESPIRATION RATE: 20 BRPM | WEIGHT: 293 LBS | HEART RATE: 97 BPM

## 2024-03-20 DIAGNOSIS — J06.9 URI WITH COUGH AND CONGESTION: Primary | ICD-10-CM

## 2024-03-20 DIAGNOSIS — R05.9 COUGH: ICD-10-CM

## 2024-03-20 LAB
INFLUENZA A, MOLECULAR: NEGATIVE
INFLUENZA B, MOLECULAR: NEGATIVE
SARS-COV-2 RDRP RESP QL NAA+PROBE: NEGATIVE
SPECIMEN SOURCE: NORMAL

## 2024-03-20 PROCEDURE — 25000242 PHARM REV CODE 250 ALT 637 W/ HCPCS: Performed by: NURSE PRACTITIONER

## 2024-03-20 PROCEDURE — 94761 N-INVAS EAR/PLS OXIMETRY MLT: CPT

## 2024-03-20 PROCEDURE — 87502 INFLUENZA DNA AMP PROBE: CPT | Performed by: NURSE PRACTITIONER

## 2024-03-20 PROCEDURE — 94640 AIRWAY INHALATION TREATMENT: CPT

## 2024-03-20 PROCEDURE — U0002 COVID-19 LAB TEST NON-CDC: HCPCS | Performed by: NURSE PRACTITIONER

## 2024-03-20 PROCEDURE — 25000003 PHARM REV CODE 250: Performed by: NURSE PRACTITIONER

## 2024-03-20 PROCEDURE — 99284 EMERGENCY DEPT VISIT MOD MDM: CPT | Mod: 25

## 2024-03-20 RX ORDER — IPRATROPIUM BROMIDE AND ALBUTEROL SULFATE 2.5; .5 MG/3ML; MG/3ML
3 SOLUTION RESPIRATORY (INHALATION)
Status: COMPLETED | OUTPATIENT
Start: 2024-03-20 | End: 2024-03-20

## 2024-03-20 RX ORDER — ALBUTEROL SULFATE 90 UG/1
1-2 AEROSOL, METERED RESPIRATORY (INHALATION) EVERY 6 HOURS PRN
Qty: 18 G | Refills: 0 | Status: SHIPPED | OUTPATIENT
Start: 2024-03-20 | End: 2025-03-20

## 2024-03-20 RX ORDER — BENZONATATE 100 MG/1
100 CAPSULE ORAL 3 TIMES DAILY PRN
Qty: 30 CAPSULE | Refills: 0 | Status: SHIPPED | OUTPATIENT
Start: 2024-03-20 | End: 2024-03-30

## 2024-03-20 RX ORDER — AMLODIPINE BESYLATE 5 MG/1
10 TABLET ORAL
Status: COMPLETED | OUTPATIENT
Start: 2024-03-20 | End: 2024-03-20

## 2024-03-20 RX ADMIN — IPRATROPIUM BROMIDE AND ALBUTEROL SULFATE 3 ML: 2.5; .5 SOLUTION RESPIRATORY (INHALATION) at 08:03

## 2024-03-20 RX ADMIN — AMLODIPINE BESYLATE 10 MG: 5 TABLET ORAL at 08:03

## 2024-03-20 NOTE — DISCHARGE INSTRUCTIONS
You were seen and evaluated in the ER today.  Your swabs and x-ray are negative for any acute infectious process.  This is likely a viral illness.  We are going to give you an albuterol inhaler to help with your chest tightness and Tessalon for cough.  Please increase fluids and bland diet.  You can continue over-the-counter medications as needed.  Please return to the ED for any worsening symptoms such as chest pain, shortness of breath, fever not controlled with Tylenol or ibuprofen or uncontrolled pain.      Our goal in the emergency department is to always give you outstanding care and exceptional service. You may receive a survey by mail or e-mail in the next week regarding your experience in our ED. We would greatly appreciate your completing and returning the survey. Your feedback provides us with a way to recognize our staff who give very good care and it helps us learn how to improve when your experience was below our aspiration of excellence.

## 2024-03-20 NOTE — ED PROVIDER NOTES
"Source of History:  Patient, chart, family    Chief complaint:  URI (C/o congestion, cough. )      HPI:  Ninoska Salguero is a 49 y.o. female with medical history of hypertension presenting with cough, congestion for the past 2 weeks.  Patient states cough worse over the past few days.  Patient states she has been taking Coricidin HBP with minimal relief.  Patient states cough became productive 2 days ago..    This is the extent to the patients complaints today here in the emergency department.    ROS: As per HPI and below:  Constitutional:  Positive for change in activity. No fever.  No chills.  No change in appetite.    Eyes: No visual changes.  ENT:  Positive for nasal congestion.    Cardiovascular: No chest pain.  Respiratory: No shortness of breath positive for cough.  GI: No abdominal pain.  No nausea or vomiting.  Genito-Urinary: No abnormal urination.  Neurologic: No focal weakness.  No numbness.  No headache.  MSK: no back pain.  Integument: No rashes or lesions.  Hematologic: No easy bruising.  Endocrine: No excessive thirst or urination.    Review of patient's allergies indicates:   Allergen Reactions    Aspirin        PMH:  As per HPI and below:  Past Medical History:   Diagnosis Date    Anemia, unspecified     Hypertension     Obese body habitus      No past surgical history on file.         Physical Exam:    BP (!) 147/68   Pulse 96   Temp 99.1 °F (37.3 °C) (Oral)   Resp 16   Ht 5' 7" (1.702 m)   Wt (!) 149.2 kg (329 lb)   SpO2 100%   BMI 51.53 kg/m²   Nursing note and vital signs reviewed. Obese  Constitutional: No acute distress.  Nontoxic  Eyes: No conjunctival injection.  Extraocular muscles are intact.  ENT: Oropharynx clear. No tonisillar exudate or swelling. Uvula midline and normal. No elevation of posterior oropharynx.  Nasal congestion with mucosal edema  Cardiovascular: Regular rate and rhythm.  No murmurs. No gallops. No rubs.  Respiratory:  Diminished to auscultation bilaterally with " scattered inspiratory and expiratory wheezing noted on exam.  No rhonchi or rales appreciated.  No accessory muscle usage.  Abdomen: Soft.  Not distended.  Nontender.  No guarding.  No rebound. Non-peritoneal.  Musculoskeletal: Good range of motion all joints.  No deformities.  Neck supple.  No meningismus.  Skin: No rashes seen.  Good turgor.  No abrasions.  No ecchymoses.  Neuro: alert and oriented x3,  no focal neurological deficits.  Psych: Appropriate, conversant    MDM    Emergent evaluation of an obese 50 yo female presenting for cough and congestion for the past 2 weeks.  Patient states cough worsened 2 days ago and became productive.  Patient has been taking over-the-counter medications with minimal relief.  On exam pt is A&Ox3.  Hypertensive on initial exam.  Patient denies any headache, dizziness or blurred vision.  Patient states she is out of her amlodipine but did take her lisinopril this morning. Nonfebrile and nontoxic appearing.  Mucous membranes pink and moist. Tonsils with no redness, erythema or exudates. Breath sounds diminished bilaterally with scattered inspiratory and expiratory wheezes noted on exam.  No rhonchi or rales appreciated.  No accessory muscle usage noted.  Abdomen soft and nontender. No rebound or guarding appreciated on exam.   BS WNL.  Pt speaking in full sentences.  Steady gait appreciated. Cap refill < 3 seconds.      History Acquisition   Additional history was acquired from other historians.  Family, chart    The patient's list of active medical problems, social history, medications, and allergies as documented per RN staff has been reviewed.     Differential Diagnoses   Based on available information and the initial assessment, the working differential diagnoses considered during this evaluation include but are not limited to viral URI, COVID, influenza, pneumonia, bronchitis, others.    I will get swabs, medicate and reassess.  I discussed this case with my supervising  physician.      LABS     Labs Reviewed   SARS-COV-2 RNA AMPLIFICATION, QUAL   INFLUENZA A AND B ANTIGEN    Narrative:     Specimen Source->Nasopharyngeal Swab         Imaging     Imaging Results              X-Ray Chest PA And Lateral (Final result)  Result time 03/20/24 08:43:34      Final result by Timoteo Molina Jr., MD (03/20/24 08:43:34)                   Narrative:    CLINICAL HISTORY:  49 years (1975) Female Upper respiratory infection    TECHNIQUE:  XR CHEST 2 VIEWS. 2 images obtained.    COMPARISON:  3/19/2023    FINDINGS:    Single AP portable chest reveals evidence of mild widening the cardiac mediastinal silhouette with biventricular prominence that appears equal capacity as compared to previous. Mild central vascular congestion changes are noted which reduced upon comparison with return to normal parameters. No evidence of pulmonic infiltrate, pleural effusion, or pneumothorax. Chronic degenerative spinal changes of the thoracic spine are noted. The soft tissues are within the range of normal.    IMPRESSION:  Mild central vascular congestion changes. No evidence of acute cardiopulmonary findings. No adverse change upon comparison.    Electronically signed by:  Timoteo Molina MD  03/20/2024 08:43 AM CDT Workstation: [id number]                                    A radiology report was available for my review at the time of the encounter.    EKG        Additional Consideration   All available testing was considered during the course of this workup.  Comorbidities taken into consideration during the patient's evaluation and treatment include weight, age.    Social determinants of health were taken into consideration during development of our treatment plan.    Medications   amLODIPine tablet 10 mg (10 mg Oral Given 3/20/24 0848)   albuterol-ipratropium 2.5 mg-0.5 mg/3 mL nebulizer solution 3 mL (3 mLs Nebulization Given 3/20/24 0858)      ED Course as of 03/20/24 1009   Wed Mar 20, 2024   0858  Chest x-ray independently reviewed by myself and Radiology.  Negative for any acute abnormalities.  Awaiting swabs and breathing treatment for reassessment. [RZ]   1002 COVID and flu negative.  Patient reassessed.  Patient updated on imaging results.  Advised will treat for reactive airway disease.  Will discharge home with an albuterol inhaler.  Patient given strict return to ED precautions.  Patient verbalized understanding of this plan of care.  All questions and concerns addressed. [RZ]   1007 Patient is hemodynamically stable, vital signs are normal. Discharge instructions given. Return to ED precautions discussed. Follow up as directed. Pt verbalized understanding of this plan.  Pt is stable for discharge.  [RZ]      ED Course User Index  [RZ] Batool Nicole NP             CLINICAL IMPRESSION  1. URI with cough and congestion    2. Cough         ED Disposition Condition    Discharge Stable            Instruction:  I see no indication of an emergent process beyond that addressed during our encounter but have duly counseled the patient/family regarding the need for prompt follow-up as well as the indications that should prompt immediate return to the emergency room should new or worrisome developments occur.  The patient/family has been provided with verbal and printed direction regarding our final diagnosis(es) as well as instructions regarding use of OTC and/or Rx medications intended to manage the patient's aforementioned conditions including:  ED Prescriptions       Medication Sig Dispense Start Date End Date Auth. Provider    benzonatate (TESSALON) 100 MG capsule Take 1 capsule (100 mg total) by mouth 3 (three) times daily as needed for Cough. 30 capsule 3/20/2024 3/30/2024 Batool Nicole, WILL    albuterol (PROVENTIL/VENTOLIN HFA) 90 mcg/actuation inhaler Inhale 1-2 puffs into the lungs every 6 (six) hours as needed for Wheezing. Rescue 18 g 3/20/2024 3/20/2025 Batool Nicole, NP          Patient has  been advised of following recommended follow-up instructions:  Follow-up Information       Follow up With Specialties Details Why Contact [facility]  Schedule an appointment as soon as possible for a visit  As needed Brandy FRANC CARLA LEE [id number] [phone]            The patient/family communicates understanding of all this information and all remaining questions and concerns were addressed at this time.      The patient's condition did not warrant review of the  and prescription of controlled substances.      This note was created using dictation software.  This program may occasionally mistype words and phrases.         Batool Nicole, NP  03/20/24 4523

## 2024-03-20 NOTE — CARE UPDATE
03/20/24 0858   Patient Assessment/Suction   Level of Consciousness (AVPU) alert   Respiratory Effort Normal;Unlabored   Expansion/Accessory Muscles/Retractions no use of accessory muscles   All Lung Fields Breath Sounds diminished   PRE-TX-O2   Device (Oxygen Therapy) room air   SpO2 100 %   Pulse Oximetry Type Continuous   $ Pulse Oximetry - Multiple Charge Pulse Oximetry - Multiple   Pulse 90   Resp 16   Aerosol Therapy   $ Aerosol Therapy Charges Aerosol Treatment   Daily Review of Necessity (SVN) completed   Respiratory Treatment Status (SVN) given   Treatment Route (SVN) mask;oxygen   Patient Position (SVN) HOB elevated   Post Treatment Assessment (SVN) increased aeration   Signs of Intolerance (SVN) none

## 2024-08-23 ENCOUNTER — HOSPITAL ENCOUNTER (EMERGENCY)
Facility: HOSPITAL | Age: 49
Discharge: HOME | End: 2024-08-23
Attending: STUDENT IN AN ORGANIZED HEALTH CARE EDUCATION/TRAINING PROGRAM
Payer: MEDICAID

## 2024-08-23 VITALS
TEMPERATURE: 99 F | OXYGEN SATURATION: 97 % | SYSTOLIC BLOOD PRESSURE: 225 MMHG | DIASTOLIC BLOOD PRESSURE: 104 MMHG | BODY MASS INDEX: 45.99 KG/M2 | RESPIRATION RATE: 20 BRPM | HEIGHT: 67 IN | HEART RATE: 100 BPM | WEIGHT: 293 LBS

## 2024-08-23 DIAGNOSIS — I10 HYPERTENSION: ICD-10-CM

## 2024-08-23 DIAGNOSIS — U07.1 COVID-19: Primary | ICD-10-CM

## 2024-08-23 LAB
B-HCG UR QL: NEGATIVE
BILIRUB UR QL STRIP: NEGATIVE
CLARITY UR: CLEAR
COLOR UR: YELLOW
CTP QC/QA: YES
GLUCOSE UR QL STRIP: NEGATIVE
HGB UR QL STRIP: NEGATIVE
INFLUENZA A, MOLECULAR: NEGATIVE
INFLUENZA B, MOLECULAR: NEGATIVE
KETONES UR QL STRIP: NEGATIVE
LEUKOCYTE ESTERASE UR QL STRIP: ABNORMAL
MICROSCOPIC COMMENT: NORMAL
NITRITE UR QL STRIP: NEGATIVE
PH UR STRIP: 6 [PH] (ref 5–8)
PROT UR QL STRIP: NEGATIVE
RBC #/AREA URNS HPF: 0 /HPF (ref 0–4)
SARS-COV-2 RDRP RESP QL NAA+PROBE: POSITIVE
SP GR UR STRIP: 1.02 (ref 1–1.03)
SPECIMEN SOURCE: NORMAL
SQUAMOUS #/AREA URNS HPF: 3 /HPF
URN SPEC COLLECT METH UR: ABNORMAL
UROBILINOGEN UR STRIP-ACNC: NEGATIVE EU/DL
WBC #/AREA URNS HPF: 1 /HPF (ref 0–5)

## 2024-08-23 PROCEDURE — 81025 URINE PREGNANCY TEST: CPT | Performed by: NURSE PRACTITIONER

## 2024-08-23 PROCEDURE — 87502 INFLUENZA DNA AMP PROBE: CPT | Performed by: NURSE PRACTITIONER

## 2024-08-23 PROCEDURE — U0002 COVID-19 LAB TEST NON-CDC: HCPCS | Performed by: NURSE PRACTITIONER

## 2024-08-23 PROCEDURE — 81001 URINALYSIS AUTO W/SCOPE: CPT | Performed by: NURSE PRACTITIONER

## 2024-08-23 PROCEDURE — 99284 EMERGENCY DEPT VISIT MOD MDM: CPT | Mod: 25

## 2024-08-23 PROCEDURE — 93005 ELECTROCARDIOGRAM TRACING: CPT | Performed by: GENERAL PRACTICE

## 2024-08-23 PROCEDURE — 93010 ELECTROCARDIOGRAM REPORT: CPT | Mod: ,,, | Performed by: GENERAL PRACTICE

## 2024-08-23 RX ORDER — LISINOPRIL 20 MG/1
40 TABLET ORAL DAILY
Qty: 28 TABLET | Refills: 0 | Status: SHIPPED | OUTPATIENT
Start: 2024-08-23 | End: 2024-09-06

## 2024-08-23 RX ORDER — AMLODIPINE BESYLATE 5 MG/1
5 TABLET ORAL DAILY
Qty: 14 TABLET | Refills: 0 | Status: SHIPPED | OUTPATIENT
Start: 2024-08-23 | End: 2024-09-06

## 2024-08-23 RX ORDER — NIRMATRELVIR AND RITONAVIR 300-100 MG
KIT ORAL
Qty: 30 TABLET | Refills: 0 | Status: SHIPPED | OUTPATIENT
Start: 2024-08-23 | End: 2024-08-28

## 2024-08-23 NOTE — Clinical Note
"Ninoska"Ciro Salguero was seen and treated in our emergency department on 8/23/2024.     COVID-19 is present in our communities across the state. There is limited testing for COVID at this time, so not all patients can be tested. In this situation, your employee meets the following criteria:    Ninoska Salguero has met the criteria for COVID-19 testing and has a POSITIVE result. She can return to work once they are asymptomatic for 24 hours without the use of fever reducing medications AND at least five days from the first positive result. A mask is recommended for 5 days post quarantine.     If you have any questions or concerns, or if I can be of further assistance, please do not hesitate to contact me.    Sincerely,              RN"

## 2024-08-24 DIAGNOSIS — U07.1 COVID-19 VIRUS DETECTED: ICD-10-CM

## 2024-08-24 NOTE — FIRST PROVIDER EVALUATION
Emergency Department TeleTriage Encounter Note      CHIEF COMPLAINT    Chief Complaint   Patient presents with    COVID-19 Concerns     X3 days, cough, congestion, body aches    Hypertension     Out of meds for 2 days       VITAL SIGNS   Initial Vitals   BP Pulse Resp Temp SpO2   08/23/24 1902 08/23/24 1900 08/23/24 1900 08/23/24 1900 08/23/24 1900   (!) 213/113 100 20 98.8 °F (37.1 °C) 97 %      MAP       --                   ALLERGIES    Review of patient's allergies indicates:   Allergen Reactions    Aspirin        PROVIDER TRIAGE NOTE  Verbal consent for the teletriage evaluation was given by the patient at the start of the evaluation.  All efforts will be made to maintain patient's privacy during the evaluation.      This is a teletriage evaluation of a 49 y.o. female presenting to the ED with c/o HA, cough, runny nose, and body aches for 3 nights.  Out BP meds for 2-3 days. Limited physical exam via telehealth: The patient is awake, alert, answering questions appropriately and is not in respiratory distress.  As the Teletriage provider, I performed an initial assessment and ordered appropriate labs and imaging studies, if any, to facilitate the patient's care once placed in the ED. Once a room is available, care and a full evaluation will be completed by an alternate ED provider.  Any additional orders and the final disposition will be determined by that provider.  All imaging and labs will not be followed-up by the Teletriage Team, including myself.          ORDERS  Labs Reviewed - No data to display    ED Orders (720h ago, onward)      Start Ordered     Status Ordering Provider    08/23/24 2016 08/23/24 2015  Cardiac Monitoring - Adult  Continuous        Comments: Notify Physician If:    Ordered NICK FAGAN    08/23/24 2016 08/23/24 2015  Pulse Oximetry Continuous  Continuous         Ordered NICK FAGAN    Unscheduled 08/23/24 2015  Saline lock IV  Once         Ordered NICK FAGAN    Unscheduled  08/23/24 2015  EKG 12-lead  Once         Ordered NICK FAGAN    Unscheduled 08/23/24 2015  POCT urine pregnancy  Once         Ordered NICK FAGAN    Unscheduled 08/23/24 2015  Urinalysis, Reflex to Urine Culture Urine, Clean Catch  STAT         Ordered NICK FAGAN    Unscheduled 08/23/24 2015  COVID-19 Rapid Screening  STAT         Ordered NICK FAGAN    Unscheduled 08/23/24 2015  Influenza antigen Nasopharyngeal Swab  Once         Ordered NICK FAGAN              Virtual Visit Note: The provider triage portion of this emergency department evaluation and documentation was performed via i-drive, a HIPAA-compliant telemedicine application, in concert with a tele-presenter in the room. A face to face patient evaluation with one of my colleagues will occur once the patient is placed in an emergency department room.      DISCLAIMER: This note was prepared with [employer] voice recognition transcription software. Garbled syntax, mangled pronouns, and other bizarre constructions may be attributed to that software system.

## 2024-08-24 NOTE — DISCHARGE INSTRUCTIONS
You were seen for flu-like symptoms, you were diagnosed with COVID.  I refilled your blood pressure medicines as you requested, amlodipine and lisinopril.  I wrote a prescription for Paxlovid which is medicine you can take for COVID.  It may prevent you from getting more sick.  While you are taking pack severe take your blood pressure multiple times a day and look out for signs of low blood pressure.     Please return to this facility or another ED as needed for any new or worsening symptoms including chest pain, shortness of breath, abdominal pain, nausea or vomiting, fever or chills. Please follow up with your primary care doctor in 3 days. Please take all medicines as prescribed.

## 2024-08-24 NOTE — ED PROVIDER NOTES
Encounter Date: 8/23/2024       History     Chief Complaint   Patient presents with    COVID-19 Concerns     X3 days, cough, congestion, body aches    Hypertension     Out of meds for 2 days     HPI  49-year-old woman presents for 3 days of flu-like symptoms nonproductive cough nasal congestion body aches.  She reports she has been out of her hypertensive medicine 2-3 days.  Takes amlodipine and lisinopril.  Denies changes in vision chest pain shortness of breath belly pain nausea vomiting change in bowel or bladder habits.  Review of patient's allergies indicates:   Allergen Reactions    Aspirin      Past Medical History:   Diagnosis Date    Anemia, unspecified     Hypertension     Obese body habitus      History reviewed. No pertinent surgical history.  No family history on file.     Review of Systems   All other systems reviewed and are negative.      Physical Exam     Initial Vitals   BP Pulse Resp Temp SpO2   08/23/24 1902 08/23/24 1900 08/23/24 1900 08/23/24 1900 08/23/24 1900   (!) 213/113 100 20 98.8 °F (37.1 °C) 97 %      MAP       --                Physical Exam    Nursing note and vitals reviewed.  Constitutional: She appears well-developed. She is not diaphoretic.   HENT:   Head: Normocephalic and atraumatic.   Eyes: Right eye exhibits no discharge. Left eye exhibits no discharge.   Neck: No tracheal deviation present.   Cardiovascular:  Normal rate, regular rhythm and intact distal pulses.           Pulmonary/Chest: Breath sounds normal. No stridor. No respiratory distress.   Abdominal: Abdomen is soft. There is no abdominal tenderness.   Musculoskeletal:         General: No tenderness.     Neurological: She is alert and oriented to person, place, and time.   Right-sided facial droop is present, chronic secondary to Bell's    Extraocular movements intact, 5/5  strength bilaterally 5/5 dorsal plantar flexion bilaterally ambulates with a safe and steady gait   Skin: Skin is warm and dry.         ED  Course   Procedures  Labs Reviewed   URINALYSIS, REFLEX TO URINE CULTURE - Abnormal       Result Value    Specimen UA Urine, Clean Catch      Color, UA Yellow      Appearance, UA Clear      pH, UA 6.0      Specific Gravity, UA 1.020      Protein, UA Negative      Glucose, UA Negative      Ketones, UA Negative      Bilirubin (UA) Negative      Occult Blood UA Negative      Nitrite, UA Negative      Urobilinogen, UA Negative      Leukocytes, UA Trace (*)     Narrative:     Specimen Source->Urine   SARS-COV-2 RNA AMPLIFICATION, QUAL - Abnormal    SARS-CoV-2 RNA, Amplification, Qual Positive (*)    INFLUENZA A AND B ANTIGEN    Influenza A, Molecular Negative      Influenza B, Molecular Negative      Flu A & B Source NP      Narrative:     Specimen Source->Nasopharyngeal Swab   URINALYSIS MICROSCOPIC    RBC, UA 0      WBC, UA 1      Squam Epithel, UA 3      Microscopic Comment SEE COMMENT      Narrative:     Specimen Source->Urine   POCT URINE PREGNANCY    POC Preg Test, Ur Negative       Acceptable Yes            Imaging Results    None          Medications - No data to display  Medical Decision Making  49-year-old hypertensive lady comes in because she thinks she has COVID, she is profoundly hypertensive, she is neurologically intact except for chronic right-sided facial droop from bowels, she is well-appearing, clear to auscultation bilaterally, she is COVID positive, she does not have any blood in her urine.  I do not think she is suffering from a hypertensive emergency.  Looking at prior visits she is usually pretty hypertensive IC systolics in the 180s or 200s.  She asked that I refill her lisinopril and amlodipine.  I also wrote a prescription for Paxlovid, gave her instructions regarding possible interaction with amlodipine, monitor blood pressure.  I do not think she needs labs or imaging based on history and physical.  She wants to go home on my exam I think this is reasonable    Amount and/or  Complexity of Data Reviewed  Independent Historian: caregiver     Details: Asks if they can get an asthma refill  External Data Reviewed: notes.     Details: Elevated systolic blood pressures previously  Labs: ordered. Decision-making details documented in ED Course.  ECG/medicine tests: independent interpretation performed.     Details: EKG as in ED course, she does not have any chest pain or shortness of breath EKG is reassuring    Risk  Prescription drug management.               ED Course as of 08/23/24 2231   Fri Aug 23, 2024   2210 EKG on my independent interpretation, normal sinus rhythm proximally 99 beats per minute, normal axis, QTC less than 500, no STEMI when compared to prior from March 19, 2023 appears similar [IC]      ED Course User Index  [IC] Adolfo Dean MD                           Clinical Impression:  Final diagnoses:  [I10] Hypertension  [U07.1] COVID-19 (Primary)          ED Disposition Condition    Discharge Stable          ED Prescriptions       Medication Sig Dispense Start Date End Date Auth. Provider    nirmatrelvir-ritonavir (PAXLOVID) 300 mg (150 mg x 2)-100 mg copackaged tablets (EUA) Take 3 tablets by mouth 2 (two) times daily. Each dose contains 2 nirmatrelvir (pink tablets) and 1 ritonavir (white tablet). Take all 3 tablets together 30 tablet 8/23/2024 8/28/2024 Adolfo Dean MD    lisinopriL (PRINIVIL,ZESTRIL) 20 MG tablet Take 2 tablets (40 mg total) by mouth once daily. for 14 days 28 tablet 8/23/2024 9/6/2024 Adolfo Dean MD    amLODIPine (NORVASC) 5 MG tablet Take 1 tablet (5 mg total) by mouth once daily. for 14 days 14 tablet 8/23/2024 9/6/2024 Adolfo Dean MD          Follow-up Information       Follow up With Specialties Details Why Contact Info Additional Information    [facility]  Schedule an appointment as soon as possible for a visit on 8/26/2024  Brandy LEE  [id number]  [phone]       [facility] - Emergency Dept Emergency Medicine Go to  As needed, If symptoms worsen [address] [facility] [address]  [phone] 1st floor             Adolfo Dean MD  08/23/24 6172

## 2024-08-24 NOTE — ED NOTES
Pt was out in lobby, told  she was ready to go and needed her dc papers,  put pt back in room.   MD discussing results with pt at this time.

## 2024-08-29 LAB
OHS QRS DURATION: 96 MS
OHS QTC CALCULATION: 469 MS

## 2024-09-06 ENCOUNTER — HOSPITAL ENCOUNTER (EMERGENCY)
Facility: HOSPITAL | Age: 49
Discharge: HOME | End: 2024-09-06
Attending: EMERGENCY MEDICINE
Payer: MEDICAID

## 2024-09-06 VITALS
SYSTOLIC BLOOD PRESSURE: 175 MMHG | HEART RATE: 83 BPM | DIASTOLIC BLOOD PRESSURE: 82 MMHG | OXYGEN SATURATION: 99 % | WEIGHT: 293 LBS | TEMPERATURE: 99 F | RESPIRATION RATE: 17 BRPM | BODY MASS INDEX: 45.99 KG/M2 | HEIGHT: 67 IN

## 2024-09-06 DIAGNOSIS — M79.671 PAIN IN BOTH FEET: Primary | ICD-10-CM

## 2024-09-06 DIAGNOSIS — M79.672 PAIN IN BOTH FEET: Primary | ICD-10-CM

## 2024-09-06 DIAGNOSIS — R52 PAIN: ICD-10-CM

## 2024-09-06 LAB
B-HCG UR QL: NEGATIVE
CTP QC/QA: YES

## 2024-09-06 PROCEDURE — 99283 EMERGENCY DEPT VISIT LOW MDM: CPT | Mod: 25

## 2024-09-06 PROCEDURE — 81025 URINE PREGNANCY TEST: CPT | Performed by: EMERGENCY MEDICINE

## 2024-09-06 RX ORDER — HYDROCODONE BITARTRATE AND ACETAMINOPHEN 5; 325 MG/1; MG/1
1 TABLET ORAL EVERY 6 HOURS PRN
Qty: 10 TABLET | Refills: 0 | Status: SHIPPED | OUTPATIENT
Start: 2024-09-06

## 2024-09-06 NOTE — ED PROVIDER NOTES
Encounter Date: 9/6/2024       History     Chief Complaint   Patient presents with    Foot Pain     Bilateral foot pain x 2 days - pain is now radiating from feet to lower back - pt states she has hx of bone spurs and arthritis in feet     49-year-old female with a past medical history of obesity, anemia, hypertension presents emergency department with bilateral foot pain.  Patient states that she stands on her feet long periods of time for work, and her feet are hurting.  Patient denies any calf tenderness or swelling.  The patient has taken Tylenol for pain    The history is provided by the patient.     Review of patient's allergies indicates:   Allergen Reactions    Aspirin      Past Medical History:   Diagnosis Date    Anemia, unspecified     Hypertension     Obese body habitus      History reviewed. No pertinent surgical history.  No family history on file.  Social History     Tobacco Use    Smoking status: Unknown     Review of Systems   Constitutional: Negative.    HENT: Negative.     Cardiovascular: Negative.  Negative for leg swelling.   Gastrointestinal: Negative.    Genitourinary: Negative.    Musculoskeletal:         Bilateral foot pain   Neurological: Negative.    Hematological: Negative.        Physical Exam     Initial Vitals [09/06/24 0911]   BP Pulse Resp Temp SpO2   (!) 188/95 88 18 98.6 °F (37 °C) 100 %      MAP       --         Physical Exam    Nursing note and vitals reviewed.  Constitutional: She appears well-developed and well-nourished.   HENT:   Head: Normocephalic and atraumatic.   Musculoskeletal:      Right foot: Tenderness present.      Left foot: Tenderness present.      Comments: Patient has bilateral tenderness to both feet, there is no signs of trauma, no swelling pedal pulses are intact.  Negative Homans sign bilaterally     Neurological: She is alert and oriented to person, place, and time.   Skin: Skin is warm and dry. No rash noted.         ED Course   Procedures  Labs Reviewed    POCT URINE PREGNANCY       Result Value    POC Preg Test, Ur Negative       Acceptable Yes            Imaging Results              X-Ray Foot Complete Bilateral (Final result)  Result time 09/06/24 10:39:58      Final result by Edvin Rose DO (09/06/24 10:39:58)                   Impression:      1. Moderate multifocal osteoarthropathy of the bilateral feet as described.  These changes are worst at the midfoot.  2. Bilateral Achilles and plantar calcaneal enthesopathy.  3. Bilateral os peroneum and os trigonum versus Stieda process.      Electronically signed by: Edvin Rose  Date:    09/06/2024  Time:    10:39               Narrative:    EXAMINATION:  XR FOOT COMPLETE 3 VIEW BILATERAL    CLINICAL HISTORY:  Pain, unspecified    FINDINGS:  Multiple views of the bilateral feet show no fracture, dislocation, or destructive osseous lesion. Bilateral os peroneum noted.  There is bilateral plantar and Achilles calcaneal enthesopathy.  Bilateral os trigonum versus stieda process noted.  There is bilateral dorsal midfoot moderate osteophytosis.  Very mild degenerative changes of the interphalangeal joints of the feet.  Mild bilateral 1st MTP joint degenerative changes.  No gross soft tissue abnormality is observed bilaterally.                                       Medications - No data to display  Medical Decision Making  49-year-old female with a past medical history of obesity, anemia, hypertension presents emergency department with bilateral foot pain.  Patient states that she stands on her feet long periods of time for work, and her feet are hurting.  Patient denies any calf tenderness or swelling.  The patient has taken Tylenol for pain    The history is provided by the patient.   Considerations include but not limited to, arthritis, bone spurs, foreign body, arterial insufficiency, DVT    49-year-old female morbidly obese presents emergency department with complaint of bilateral foot pain.   Patient states she stands a long period of time work she currently has low so she was on I have instructed the patient she needs to wear supportive shoes, and Christiano hose while walking.  Patient has no swelling to the bilateral lower extremities negative Homans sign her pulses are normal capillary refill is normal denies any symptoms of claudication x-ray imaging performed which reveals Moderate multifocal osteoarthropathy of the bilateral feet as described.  These changes are worst at the midfoot.  2. Bilateral Achilles and plantar calcaneal enthesopathy.  3. Bilateral os peroneum and os trigonum versus Stieda process.  Patient will be referred to Podiatry for further evaluation, definitive care she will be given a short course of pain medication she was given return precautions    Amount and/or Complexity of Data Reviewed  Labs: ordered. Decision-making details documented in ED Course.  Radiology: ordered. Decision-making details documented in ED Course.    Risk  Prescription drug management.                                      Clinical Impression:  Final diagnoses:  [R52] Pain  [M79.671, M79.672] Pain in both feet (Primary)          ED Disposition Condition    Discharge Stable          ED Prescriptions       Medication Sig Dispense Start Date End Date Auth. Provider    HYDROcodone-acetaminophen (NORCO) 5-325 mg per tablet Take 1 tablet by mouth every 6 (six) hours as needed for Pain. 10 tablet 9/6/2024 -- Shi Sauer FNP          Follow-up Information       Follow up With Specialties Details Why Contact Info    Max Field DPM Podiatry, Surgery, Wound Care Schedule an appointment as soon as possible for a visit in 1 week  [address] [facility]  [address] [place] [id number]  [phone]               Shi Sauer FNP  09/06/24 6469

## 2024-09-06 NOTE — DISCHARGE INSTRUCTIONS
Take medications as directed   Please follow up with the podiatrist as directed   Return for any concerns

## 2024-09-06 NOTE — Clinical Note
"Ninoska Madridchristina Salguero was seen and treated in our emergency department on 9/6/2024.  She may return to work on 09/07/2024.       If you have any questions or concerns, please don't hesitate to call.      Shi Sauer, SAMI"

## 2024-10-26 ENCOUNTER — HOSPITAL ENCOUNTER (EMERGENCY)
Facility: HOSPITAL | Age: 49
Discharge: HOME | End: 2024-10-26
Attending: EMERGENCY MEDICINE
Payer: MEDICAID

## 2024-10-26 VITALS
DIASTOLIC BLOOD PRESSURE: 85 MMHG | BODY MASS INDEX: 45.99 KG/M2 | SYSTOLIC BLOOD PRESSURE: 155 MMHG | TEMPERATURE: 98 F | OXYGEN SATURATION: 97 % | HEIGHT: 67 IN | WEIGHT: 293 LBS | RESPIRATION RATE: 18 BRPM | HEART RATE: 96 BPM

## 2024-10-26 DIAGNOSIS — J02.9 SORE THROAT: Primary | ICD-10-CM

## 2024-10-26 DIAGNOSIS — J06.9 VIRAL URI WITH COUGH: ICD-10-CM

## 2024-10-26 LAB
GROUP A STREP, MOLECULAR: NEGATIVE
INFLUENZA A, MOLECULAR: NEGATIVE
INFLUENZA B, MOLECULAR: NEGATIVE
SARS-COV-2 RDRP RESP QL NAA+PROBE: NEGATIVE
SPECIMEN SOURCE: NORMAL

## 2024-10-26 PROCEDURE — 87635 SARS-COV-2 COVID-19 AMP PRB: CPT

## 2024-10-26 PROCEDURE — 99284 EMERGENCY DEPT VISIT MOD MDM: CPT

## 2024-10-26 PROCEDURE — 25000003 PHARM REV CODE 250

## 2024-10-26 PROCEDURE — 87502 INFLUENZA DNA AMP PROBE: CPT

## 2024-10-26 PROCEDURE — 87651 STREP A DNA AMP PROBE: CPT

## 2024-10-26 RX ORDER — BENZONATATE 100 MG/1
100 CAPSULE ORAL 3 TIMES DAILY PRN
Qty: 15 CAPSULE | Refills: 0 | Status: SHIPPED | OUTPATIENT
Start: 2024-10-26 | End: 2024-10-31

## 2024-10-26 RX ORDER — PHENOL 1.4 %
AEROSOL, SPRAY (ML) MUCOUS MEMBRANE
Qty: 20 ML | Refills: 0 | Status: SHIPPED | OUTPATIENT
Start: 2024-10-26 | End: 2024-10-28

## 2024-10-26 RX ORDER — LIDOCAINE 50 MG/G
1 PATCH TOPICAL
Status: DISCONTINUED | OUTPATIENT
Start: 2024-10-26 | End: 2024-10-26 | Stop reason: HOSPADM

## 2024-10-26 RX ADMIN — LIDOCAINE 1 PATCH: 50 PATCH TOPICAL at 03:10

## 2024-11-08 ENCOUNTER — HOSPITAL ENCOUNTER (EMERGENCY)
Facility: HOSPITAL | Age: 49
Discharge: HOME | End: 2024-11-08
Attending: EMERGENCY MEDICINE
Payer: MEDICAID

## 2024-11-08 VITALS
WEIGHT: 293 LBS | BODY MASS INDEX: 50.9 KG/M2 | HEART RATE: 81 BPM | DIASTOLIC BLOOD PRESSURE: 92 MMHG | SYSTOLIC BLOOD PRESSURE: 168 MMHG | RESPIRATION RATE: 18 BRPM | TEMPERATURE: 98 F | OXYGEN SATURATION: 100 %

## 2024-11-08 DIAGNOSIS — K08.9 CHRONIC DENTAL PAIN: ICD-10-CM

## 2024-11-08 DIAGNOSIS — G89.29 CHRONIC DENTAL PAIN: ICD-10-CM

## 2024-11-08 DIAGNOSIS — K02.9 DENTAL CARIES: Primary | ICD-10-CM

## 2024-11-08 LAB
B-HCG UR QL: NEGATIVE
CTP QC/QA: YES

## 2024-11-08 PROCEDURE — 25000003 PHARM REV CODE 250: Performed by: NURSE PRACTITIONER

## 2024-11-08 PROCEDURE — 99284 EMERGENCY DEPT VISIT MOD MDM: CPT

## 2024-11-08 PROCEDURE — 81025 URINE PREGNANCY TEST: CPT | Performed by: NURSE PRACTITIONER

## 2024-11-08 RX ORDER — TRAMADOL HYDROCHLORIDE 50 MG/1
50 TABLET ORAL EVERY 6 HOURS PRN
Qty: 12 TABLET | Refills: 0 | Status: SHIPPED | OUTPATIENT
Start: 2024-11-08

## 2024-11-08 RX ORDER — CLINDAMYCIN HYDROCHLORIDE 300 MG/1
300 CAPSULE ORAL EVERY 6 HOURS
Qty: 40 CAPSULE | Refills: 0 | Status: SHIPPED | OUTPATIENT
Start: 2024-11-08 | End: 2024-11-18

## 2024-11-08 RX ORDER — OXYCODONE AND ACETAMINOPHEN 5; 325 MG/1; MG/1
1 TABLET ORAL
Status: COMPLETED | OUTPATIENT
Start: 2024-11-08 | End: 2024-11-08

## 2024-11-08 RX ADMIN — OXYCODONE HYDROCHLORIDE AND ACETAMINOPHEN 1 TABLET: 5; 325 TABLET ORAL at 09:11

## 2024-11-08 NOTE — DISCHARGE INSTRUCTIONS
Make a follow-up appoint with your dentist.  Return to the ED for any worsening symptoms or any other concerns.

## 2024-11-08 NOTE — ED NOTES
Bed: [facility] 01 - A Chair  Expected date:   Expected time:   Means of arrival:   Comments:

## 2024-11-08 NOTE — ED PROVIDER NOTES
Encounter Date: 11/8/2024       History     Chief Complaint   Patient presents with    Left tooth/jaw pain     Started yesterday, radiates to left ear     Presents with dental pain.  Onset yesterday.  Left upper and lower jaw.  Patient was taken Tylenol for the pain.  This patient has grave tooth decay throughout her mouth.  Onset 10 years ago.  I asked if she has been a meth user she denied.  She reports she has to have all of them pulled.  She can not find someone who is a dentist willing to take Medicaid.  She denies fever.  Denies nausea vomiting.      Review of patient's allergies indicates:   Allergen Reactions    Aspirin      Past Medical History:   Diagnosis Date    Anemia, unspecified     Hypertension     Obese body habitus      No past surgical history on file.  No family history on file.  Social History     Tobacco Use    Smoking status: Unknown     Review of Systems   Constitutional:  Negative for fever.   HENT:  Positive for dental problem. Negative for drooling, ear pain, facial swelling and trouble swallowing.    Respiratory:  Negative for cough, shortness of breath and wheezing.    Cardiovascular:  Negative for chest pain, palpitations and leg swelling.   Gastrointestinal:  Negative for abdominal pain, diarrhea, nausea and vomiting.   Genitourinary:  Negative for dysuria.   Musculoskeletal:  Negative for back pain.   Skin:  Negative for rash.   Neurological:  Negative for weakness.       Physical Exam     Initial Vitals [11/08/24 0813]   BP Pulse Resp Temp SpO2   (!) 168/92 81 16 98.2 °F (36.8 °C) 100 %      MAP       --         Physical Exam    Constitutional: She appears well-developed and well-nourished.   HENT:   Head: Normocephalic. Mouth/Throat: Oropharynx is clear and moist.   Moderate amount of severe dental caries throughout the oral cavity.  There is no facial swelling.  Patient can open her mouth completely.  Her airway is patent.   Eyes: Conjunctivae are normal.   Neck: Neck supple.    Normal range of motion.  Cardiovascular:  Normal rate, regular rhythm and normal heart sounds.           Pulmonary/Chest: Breath sounds normal. No respiratory distress.   Musculoskeletal:         General: Normal range of motion.      Cervical back: Normal range of motion and neck supple.      Comments: Moves all extremities without difficulty.  She was ambulatory per self.  Her gait is steady.     Neurological: She is alert and oriented to person, place, and time. No sensory deficit. GCS score is 15. GCS eye subscore is 4. GCS verbal subscore is 5. GCS motor subscore is 6.   Skin: Skin is warm and dry.   Psychiatric: She has a normal mood and affect. Thought content normal.         ED Course   Procedures  Labs Reviewed   POCT URINE PREGNANCY       Result Value    POC Preg Test, Ur Negative       Acceptable Yes            Imaging Results    None          Medications   oxyCODONE-acetaminophen 5-325 mg per tablet 1 tablet (1 tablet Oral Given 11/8/24 0901)     Medical Decision Making  Presents with dental pain.  Negative facial swelling or fever.  Patient has a 10 year history of severe dental decay.  I asked her she has been a meth user.  This has what it appears to be.  She denies.    Amount and/or Complexity of Data Reviewed  Labs: ordered.  Discussion of management or test interpretation with external provider(s): Patient was given clindamycin q.i.d. for the next 10 days.  I have instructed her to follow up with a dentist.  She was given oxycodone 5 mg here in the ED. she was given clindamycin for home use as well as Ultram for home use.  She has been instructed not to drive on the Ultram.  At discharge is patient appeared to be in no acute distress.  She was accompanied by her son.  She has been given strict return precautions.    Risk  Prescription drug management.              Attending Attestation:     Physician Attestation Statement for NP/PA:   I personally made/approved the management plan  and take responsibility for the patient management.    Other NP/PA Attestation Additions:    History of Present Illness: 49-year-old female presents for dental pain.    Medical Decision Making: Initial differential diagnosis included but not limited to tooth abscess, dentalgia, and dental caries.  I did not examine this patient but was available for consultation.  I am in agreement with the nurse practitioner's assessment, treatment, and plan of care.                                    Clinical Impression:  Final diagnoses:  [K08.9, G89.29] Chronic dental pain  [K02.9] Dental caries (Primary)          ED Disposition Condition    Discharge Stable          ED Prescriptions       Medication Sig Dispense Start Date End Date Auth. Provider    clindamycin (CLEOCIN) 300 MG capsule Take 1 capsule (300 mg total) by mouth every 6 (six) hours. for 10 days 40 capsule 11/8/2024 11/18/2024 Ly Rizvi NP    traMADoL (ULTRAM) 50 mg tablet Take 1 tablet (50 mg total) by mouth every 6 (six) hours as needed for Pain. 12 tablet 11/8/2024 -- Ly Rizvi NP          Follow-up Information       Follow up With Specialties Details Why Contact Info    [facility]  In [facility]  [address] [facility] [address]  [phone]               Ly Rizvi NP  11/08/24 6070       Zac Lenz MD  11/09/24 7960

## 2024-11-24 ENCOUNTER — HOSPITAL ENCOUNTER (EMERGENCY)
Facility: HOSPITAL | Age: 49
Discharge: HOME | End: 2024-11-24
Attending: EMERGENCY MEDICINE
Payer: MEDICAID

## 2024-11-24 VITALS
TEMPERATURE: 98 F | RESPIRATION RATE: 16 BRPM | OXYGEN SATURATION: 100 % | DIASTOLIC BLOOD PRESSURE: 82 MMHG | SYSTOLIC BLOOD PRESSURE: 157 MMHG | HEART RATE: 83 BPM | WEIGHT: 293 LBS | BODY MASS INDEX: 51.53 KG/M2

## 2024-11-24 DIAGNOSIS — J06.9 VIRAL URI WITH COUGH: Primary | ICD-10-CM

## 2024-11-24 LAB
B-HCG UR QL: NEGATIVE
CTP QC/QA: YES
GROUP A STREP, MOLECULAR: NEGATIVE
INFLUENZA A, MOLECULAR: NEGATIVE
INFLUENZA B, MOLECULAR: NEGATIVE
SARS-COV-2 RDRP RESP QL NAA+PROBE: NEGATIVE
SPECIMEN SOURCE: NORMAL

## 2024-11-24 PROCEDURE — 86803 HEPATITIS C AB TEST: CPT | Performed by: EMERGENCY MEDICINE

## 2024-11-24 PROCEDURE — 25000003 PHARM REV CODE 250: Performed by: NURSE PRACTITIONER

## 2024-11-24 PROCEDURE — 87502 INFLUENZA DNA AMP PROBE: CPT | Performed by: NURSE PRACTITIONER

## 2024-11-24 PROCEDURE — 87389 HIV-1 AG W/HIV-1&-2 AB AG IA: CPT | Performed by: EMERGENCY MEDICINE

## 2024-11-24 PROCEDURE — 99283 EMERGENCY DEPT VISIT LOW MDM: CPT

## 2024-11-24 PROCEDURE — 36415 COLL VENOUS BLD VENIPUNCTURE: CPT | Performed by: EMERGENCY MEDICINE

## 2024-11-24 PROCEDURE — 81025 URINE PREGNANCY TEST: CPT | Performed by: NURSE PRACTITIONER

## 2024-11-24 PROCEDURE — 87635 SARS-COV-2 COVID-19 AMP PRB: CPT | Performed by: NURSE PRACTITIONER

## 2024-11-24 PROCEDURE — 87651 STREP A DNA AMP PROBE: CPT | Performed by: NURSE PRACTITIONER

## 2024-11-24 RX ORDER — ACETAMINOPHEN 325 MG/1
650 TABLET ORAL
Status: COMPLETED | OUTPATIENT
Start: 2024-11-24 | End: 2024-11-24

## 2024-11-24 RX ORDER — IBUPROFEN 200 MG
600 TABLET ORAL
Status: COMPLETED | OUTPATIENT
Start: 2024-11-24 | End: 2024-11-24

## 2024-11-24 RX ORDER — BENZONATATE 200 MG/1
200 CAPSULE ORAL 3 TIMES DAILY PRN
Qty: 20 CAPSULE | Refills: 0 | Status: SHIPPED | OUTPATIENT
Start: 2024-11-24 | End: 2024-12-04

## 2024-11-24 RX ADMIN — IBUPROFEN 600 MG: 200 TABLET, FILM COATED ORAL at 06:11

## 2024-11-24 RX ADMIN — ACETAMINOPHEN 650 MG: 325 TABLET ORAL at 06:11

## 2024-11-24 NOTE — Clinical Note
"Ninoska Fortune" Jose M was seen and treated in our emergency department on 11/24/2024.  She may return to work on 11/27/2024.       If you have any questions or concerns, please don't hesitate to call.      Sina"

## 2024-11-25 LAB
HCV AB SERPL QL IA: NEGATIVE
HIV 1+2 AB+HIV1 P24 AG SERPL QL IA: NEGATIVE

## 2024-11-25 NOTE — ED PROVIDER NOTES
Encounter Date: 11/24/2024       History     Chief Complaint   Patient presents with    Cough    Generalized Body Aches    Sore Throat     Ninoska Salguero is a 49 year old female with pmh HTN, anemia presenting to the ED with a two day history of body aches, cough, congestion, sore throat. She has had no vomiting/diarrhea. She has not taken any medications for her symptoms. No known sick contacts.       Review of patient's allergies indicates:   Allergen Reactions    Aspirin      Past Medical History:   Diagnosis Date    Anemia, unspecified     Hypertension     Obese body habitus      History reviewed. No pertinent surgical history.  No family history on file.  Social History     Tobacco Use    Smoking status: Unknown     Review of Systems   Constitutional:  Negative for fever.   HENT:  Positive for congestion and sore throat.    Respiratory:  Positive for cough. Negative for shortness of breath.    Cardiovascular:  Negative for chest pain.   Gastrointestinal:  Negative for diarrhea, nausea and vomiting.   Genitourinary:  Negative for dysuria.   Musculoskeletal:  Positive for myalgias. Negative for back pain.   Skin:  Negative for rash.   Neurological:  Negative for weakness.   Hematological:  Does not bruise/bleed easily.       Physical Exam     Initial Vitals [11/24/24 1652]   BP Pulse Resp Temp SpO2   (!) 157/82 83 16 97.5 °F (36.4 °C) 100 %      MAP       --         Physical Exam    Nursing note and vitals reviewed.  Constitutional: She appears well-developed and well-nourished. She is not diaphoretic. No distress.   HENT:   Head: Normocephalic and atraumatic. Mouth/Throat: Oropharynx is clear and moist.   Eyes: Conjunctivae are normal.   Neck: Neck supple.   Cardiovascular:  Normal rate, regular rhythm, normal heart sounds and intact distal pulses.     Exam reveals no gallop and no friction rub.       No murmur heard.  Pulmonary/Chest: Breath sounds normal. No respiratory distress. She has no wheezes. She has no  rhonchi. She has no rales.   Abdominal: Abdomen is soft. There is no abdominal tenderness.   Musculoskeletal:         General: Normal range of motion.      Cervical back: Neck supple.     Neurological: She is alert and oriented to person, place, and time.   Skin: No rash noted. No erythema.   Psychiatric: Her speech is normal.         ED Course   Procedures  Labs Reviewed   GROUP A STREP, MOLECULAR       Result Value    Group A Strep, Molecular Negative     SARS-COV-2 RNA AMPLIFICATION, QUAL    SARS-CoV-2 RNA, Amplification, Qual Negative     INFLUENZA A AND B ANTIGEN    Influenza A, Molecular Negative      Influenza B, Molecular Negative      Flu A & B Source Nasal swab      Narrative:     Specimen Source->Nasopharyngeal Swab   HEPATITIS C ANTIBODY   HIV 1 / 2 ANTIBODY   POCT URINE PREGNANCY    POC Preg Test, Ur Negative       Acceptable Yes            Imaging Results    None          Medications   ibuprofen tablet 600 mg (600 mg Oral Given 11/24/24 1801)   acetaminophen tablet 650 mg (650 mg Oral Given 11/24/24 1802)     Medical Decision Making  This is an urgent evaluation of a 49 year old female presenting to the ED with a two day history of upper respiratory symptoms. The patient appears to have a viral upper respiratory infection with a viral syndrome.  Based upon the history and physical exam the patient does not appear to have a serious bacterial infection such as pneumonia, sepsis, otitis media, bacterial sinusitis, strep pharyngitis, parapharyngeal or peritonsillar abscess, meningitis.  I do not think the patient needs antibiotics as their illness likely has a viral etiology.  Patient appears very well and I have given specific return precautions to the patient and/or family members.  I have instructed the patient to hydrate, take over the counter medications and follow up with their regular doctor or the one provided.    COVID, flu, and strep all negative. Based on my clinical evaluation, I  do not appreciate any immediate, emergent, or life threatening condition or etiology that warrants additional workup today and feel that the patient can be discharged with close follow up care.       Amount and/or Complexity of Data Reviewed  Labs: ordered. Decision-making details documented in ED Course.    Risk  OTC drugs.  Prescription drug management.                                      Clinical Impression:  Final diagnoses:  [J06.9] Viral URI with cough (Primary)          ED Disposition Condition    Discharge Stable          ED Prescriptions       Medication Sig Dispense Start Date End Date Auth. Provider    benzonatate (TESSALON) 200 MG capsule Take 1 capsule (200 mg total) by mouth 3 (three) times daily as needed for Cough. 20 capsule 11/24/2024 12/4/2024 Dorinda Betts NP          Follow-up Information       Follow up With Specialties Details Why Contact Info Additional Information    [facility] - Emergency Dept Emergency Medicine  As needed, If symptoms worsen [address]  [facility] [address]  [phone] 1st floor    [facility]  Schedule an appointment as soon as possible for a visit   [address] [facility] [address]  [phone]                Dorinda Betts NP  11/25/24 0025

## 2025-01-19 ENCOUNTER — HOSPITAL ENCOUNTER (EMERGENCY)
Facility: HOSPITAL | Age: 50
Discharge: HOME | End: 2025-01-19
Attending: STUDENT IN AN ORGANIZED HEALTH CARE EDUCATION/TRAINING PROGRAM
Payer: MEDICAID

## 2025-01-19 VITALS
HEIGHT: 67 IN | WEIGHT: 293 LBS | RESPIRATION RATE: 18 BRPM | TEMPERATURE: 99 F | BODY MASS INDEX: 45.99 KG/M2 | SYSTOLIC BLOOD PRESSURE: 155 MMHG | OXYGEN SATURATION: 96 % | DIASTOLIC BLOOD PRESSURE: 70 MMHG | HEART RATE: 105 BPM

## 2025-01-19 DIAGNOSIS — R05.9 COUGH: ICD-10-CM

## 2025-01-19 DIAGNOSIS — J10.1 INFLUENZA A: Primary | ICD-10-CM

## 2025-01-19 LAB
ALBUMIN SERPL BCP-MCNC: 4.1 G/DL (ref 3.5–5.2)
ALP SERPL-CCNC: 79 U/L (ref 55–135)
ALT SERPL W/O P-5'-P-CCNC: 11 U/L (ref 10–44)
ANION GAP SERPL CALC-SCNC: 9 MMOL/L (ref 8–16)
ANISOCYTOSIS BLD QL SMEAR: ABNORMAL
AST SERPL-CCNC: 16 U/L (ref 10–40)
B-HCG UR QL: NEGATIVE
BASOPHILS # BLD AUTO: 0.02 K/UL (ref 0–0.2)
BASOPHILS NFR BLD: 0.3 % (ref 0–1.9)
BILIRUB SERPL-MCNC: 0.3 MG/DL (ref 0.1–1)
BNP SERPL-MCNC: 39 PG/ML (ref 0–99)
BUN SERPL-MCNC: 12 MG/DL (ref 6–20)
CALCIUM SERPL-MCNC: 8.9 MG/DL (ref 8.7–10.5)
CHLORIDE SERPL-SCNC: 106 MMOL/L (ref 95–110)
CO2 SERPL-SCNC: 22 MMOL/L (ref 23–29)
CREAT SERPL-MCNC: 0.7 MG/DL (ref 0.5–1.4)
CTP QC/QA: YES
DIFFERENTIAL METHOD BLD: ABNORMAL
EOSINOPHIL # BLD AUTO: 0.1 K/UL (ref 0–0.5)
EOSINOPHIL NFR BLD: 1.4 % (ref 0–8)
ERYTHROCYTE [DISTWIDTH] IN BLOOD BY AUTOMATED COUNT: 23 % (ref 11.5–14.5)
EST. GFR  (NO RACE VARIABLE): >60 ML/MIN/1.73 M^2
GLUCOSE SERPL-MCNC: 98 MG/DL (ref 70–110)
GROUP A STREP, MOLECULAR: NEGATIVE
HCT VFR BLD AUTO: 28.2 % (ref 37–48.5)
HGB BLD-MCNC: 7.8 G/DL (ref 12–16)
HYPOCHROMIA BLD QL SMEAR: ABNORMAL
IMM GRANULOCYTES # BLD AUTO: 0.04 K/UL (ref 0–0.04)
IMM GRANULOCYTES NFR BLD AUTO: 0.5 % (ref 0–0.5)
INFLUENZA A, MOLECULAR: POSITIVE
INFLUENZA B, MOLECULAR: NEGATIVE
LYMPHOCYTES # BLD AUTO: 0.9 K/UL (ref 1–4.8)
LYMPHOCYTES NFR BLD: 12.7 % (ref 18–48)
MCH RBC QN AUTO: 16.2 PG (ref 27–31)
MCHC RBC AUTO-ENTMCNC: 27.7 G/DL (ref 32–36)
MCV RBC AUTO: 59 FL (ref 82–98)
MONOCYTES # BLD AUTO: 0.9 K/UL (ref 0.3–1)
MONOCYTES NFR BLD: 11.9 % (ref 4–15)
NEUTROPHILS # BLD AUTO: 5.4 K/UL (ref 1.8–7.7)
NEUTROPHILS NFR BLD: 73.2 % (ref 38–73)
NRBC BLD-RTO: 0 /100 WBC
OVALOCYTES BLD QL SMEAR: ABNORMAL
PLATELET # BLD AUTO: 381 K/UL (ref 150–450)
PLATELET BLD QL SMEAR: ABNORMAL
PMV BLD AUTO: 9.5 FL (ref 9.2–12.9)
POLYCHROMASIA BLD QL SMEAR: ABNORMAL
POTASSIUM SERPL-SCNC: 3.6 MMOL/L (ref 3.5–5.1)
PROT SERPL-MCNC: 8 G/DL (ref 6–8.4)
RBC # BLD AUTO: 4.81 M/UL (ref 4–5.4)
SARS-COV-2 RDRP RESP QL NAA+PROBE: NEGATIVE
SODIUM SERPL-SCNC: 137 MMOL/L (ref 136–145)
SPECIMEN SOURCE: ABNORMAL
TARGETS BLD QL SMEAR: ABNORMAL
WBC # BLD AUTO: 7.32 K/UL (ref 3.9–12.7)

## 2025-01-19 PROCEDURE — 80053 COMPREHEN METABOLIC PANEL: CPT

## 2025-01-19 PROCEDURE — 87651 STREP A DNA AMP PROBE: CPT

## 2025-01-19 PROCEDURE — 85025 COMPLETE CBC W/AUTO DIFF WBC: CPT

## 2025-01-19 PROCEDURE — 36415 COLL VENOUS BLD VENIPUNCTURE: CPT

## 2025-01-19 PROCEDURE — 81025 URINE PREGNANCY TEST: CPT

## 2025-01-19 PROCEDURE — 87502 INFLUENZA DNA AMP PROBE: CPT

## 2025-01-19 PROCEDURE — 93005 ELECTROCARDIOGRAM TRACING: CPT | Performed by: INTERNAL MEDICINE

## 2025-01-19 PROCEDURE — 96360 HYDRATION IV INFUSION INIT: CPT

## 2025-01-19 PROCEDURE — 93010 ELECTROCARDIOGRAM REPORT: CPT | Mod: ,,, | Performed by: INTERNAL MEDICINE

## 2025-01-19 PROCEDURE — 87635 SARS-COV-2 COVID-19 AMP PRB: CPT

## 2025-01-19 PROCEDURE — 99285 EMERGENCY DEPT VISIT HI MDM: CPT | Mod: 25

## 2025-01-19 PROCEDURE — 25000003 PHARM REV CODE 250

## 2025-01-19 PROCEDURE — 83880 ASSAY OF NATRIURETIC PEPTIDE: CPT

## 2025-01-19 RX ORDER — ACETAMINOPHEN 500 MG
1000 TABLET ORAL
Status: COMPLETED | OUTPATIENT
Start: 2025-01-19 | End: 2025-01-19

## 2025-01-19 RX ORDER — OSELTAMIVIR PHOSPHATE 75 MG/1
75 CAPSULE ORAL 2 TIMES DAILY
Qty: 10 CAPSULE | Refills: 0 | Status: SHIPPED | OUTPATIENT
Start: 2025-01-19 | End: 2025-01-24

## 2025-01-19 RX ADMIN — SODIUM CHLORIDE 1000 ML: 0.9 INJECTION, SOLUTION INTRAVENOUS at 02:01

## 2025-01-19 RX ADMIN — ACETAMINOPHEN 1000 MG: 500 TABLET, FILM COATED ORAL at 01:01

## 2025-01-19 NOTE — DISCHARGE INSTRUCTIONS
You can take the Coricidin medication to help decrease your symptoms.  Take Tamiflu as prescribed until gone this may help sort in his symptoms of your flu.  Drink plenty of fluids such as water to stay hydrated.  Please follow up with the primary doctor as soon as possible further evaluation rechecked.  Please address your anemia with them.    Please return to the ED for fever not responding to medication, worsening symptoms, chest pain, shortness of breath outside of coughing, passing out, worsening headaches, or any new or worsening concerns.

## 2025-01-19 NOTE — ED PROVIDER NOTES
Encounter Date: 1/19/2025       History     Chief Complaint   Patient presents with    Sore Throat     Sore throat and coughing since last night after work     Patient is a 50 y.o. female with past medical history of hypertension, obesity, and anemia who presents to ED via self for concern for headache, sore throat, and cough which began 1 day(s) ago.  Patient reports yesterday when she left work she has started to feel bad with a sore throat headache and cough.  Patient reports she felt worse today.  Patient reports she is coughing up light green mucus.  Patient denies fever.  Patient reports she feels all over weak.  Patient reports chest pain and shortness breath with coughing.  Patient denies any shortness breath with the ambulation.  Patient denies falls or head injuries.  Patient denies dizziness or syncope.  Patient is awake and alert in no acute distress.         Review of patient's allergies indicates:   Allergen Reactions    Aspirin      Past Medical History:   Diagnosis Date    Anemia, unspecified     Hypertension     Obese body habitus      No past surgical history on file.  No family history on file.  Social History     Tobacco Use    Smoking status: Unknown     Review of Systems   Constitutional:  Negative for fever.   HENT:  Positive for congestion and sore throat. Negative for drooling, ear discharge, ear pain, trouble swallowing and voice change.    Respiratory:  Positive for cough and shortness of breath. Negative for apnea, choking, chest tightness, wheezing and stridor.    Cardiovascular:  Positive for chest pain. Negative for leg swelling.   Gastrointestinal: Negative.  Negative for diarrhea, nausea and vomiting.   Musculoskeletal:  Negative for back pain, neck pain and neck stiffness.   Skin:  Negative for color change, pallor and rash.   Neurological:  Positive for headaches. Negative for dizziness, tremors, seizures, syncope, facial asymmetry, speech difficulty, weakness, light-headedness and  numbness.   Hematological:  Does not bruise/bleed easily.       Physical Exam     Initial Vitals [01/19/25 1156]   BP Pulse Resp Temp SpO2   (!) 182/89 99 18 99.5 °F (37.5 °C) 98 %      MAP       --         Physical Exam    Nursing note and vitals reviewed.  Constitutional: She appears well-developed and well-nourished. She is not diaphoretic.  Non-toxic appearance. No distress.   HENT:   Head: Normocephalic and atraumatic.       Right Ear: External ear normal.   Left Ear: External ear normal.   Nose: Mucosal edema present. Mouth/Throat: Uvula is midline and mucous membranes are normal. Posterior oropharyngeal erythema present. No oropharyngeal exudate, posterior oropharyngeal edema or tonsillar abscesses.   Eyes: EOM are normal.   Neck:   Normal range of motion.  Cardiovascular:  Normal rate, regular rhythm, normal heart sounds and intact distal pulses.     Exam reveals no gallop and no friction rub.       No murmur heard.  Pulmonary/Chest: Breath sounds normal. No respiratory distress. She has no wheezes. She has no rhonchi. She has no rales. She exhibits no tenderness.   Musculoskeletal:         General: Normal range of motion.      Cervical back: Normal range of motion.     Neurological: She is alert and oriented to person, place, and time. She has normal strength. She is not disoriented. She displays a negative Romberg sign. Coordination normal. GCS score is 15. GCS eye subscore is 4. GCS verbal subscore is 5. GCS motor subscore is 6.   Normal finger-to-nose   Skin: Skin is warm and dry. Capillary refill takes less than 2 seconds.   Psychiatric: She has a normal mood and affect. Her behavior is normal. Judgment and thought content normal.         ED Course   Procedures  Labs Reviewed   INFLUENZA A AND B ANTIGEN - Abnormal       Result Value    Influenza A, Molecular Positive (*)     Influenza B, Molecular Negative      Flu A & B Source Nasal swab      Narrative:     Specimen Source->Nasopharyngeal Swab     critical result(s)  Patient is Positive Flu A only , called and   verbal readback obtained from Rylan Wilson Rn. by [facility] 01/19/2025 12:58   CBC W/ AUTO DIFFERENTIAL - Abnormal    WBC 7.32      RBC 4.81      Hemoglobin 7.8 (*)     Hematocrit 28.2 (*)     MCV 59 (*)     MCH 16.2 (*)     MCHC 27.7 (*)     RDW 23.0 (*)     Platelets 381      MPV 9.5      Immature Granulocytes 0.5      Gran # (ANC) 5.4      Immature Grans (Abs) 0.04      Lymph # 0.9 (*)     Mono # 0.9      Eos # 0.1      Baso # 0.02      nRBC 0      Gran % 73.2 (*)     Lymph % 12.7 (*)     Mono % 11.9      Eosinophil % 1.4      Basophil % 0.3      Platelet Estimate Appears normal      Aniso Moderate      Poly Occasional      Hypo Moderate      Ovalocytes Occasional      Target Cells Occasional      Differential Method Automated     COMPREHENSIVE METABOLIC PANEL - Abnormal    Sodium 137      Potassium 3.6      Chloride 106      CO2 22 (*)     Glucose 98      BUN 12      Creatinine 0.7      Calcium 8.9      Total Protein 8.0      Albumin 4.1      Total Bilirubin 0.3      Alkaline Phosphatase 79      AST 16      ALT 11      eGFR >60.0      Anion Gap 9     GROUP A STREP, MOLECULAR    Group A Strep, Molecular Negative     SARS-COV-2 RNA AMPLIFICATION, QUAL    SARS-CoV-2 RNA, Amplification, Qual Negative     B-TYPE NATRIURETIC PEPTIDE   B-TYPE NATRIURETIC PEPTIDE    BNP 39     POCT URINE PREGNANCY    POC Preg Test, Ur Negative       Acceptable Yes          ECG Results              EKG 12-lead (In process)        Collection Time Result Time QRS Duration OHS QTC Calculation    01/19/25 14:20:35 01/19/25 15:31:33 94 439                     In process by Interface, Lab In [facility] (01/19/25 15:31:38)                   Narrative:    Test Reason : R06.02,    Vent. Rate : 104 BPM     Atrial Rate : 104 BPM     P-R Int : 130 ms          QRS Dur :  94 ms      QT Int : 334 ms       P-R-T Axes :  60  30 211 degrees    QTcB Int : 439 ms    Sinus tachycardia  with frequent Premature ventricular complexes  ST and T wave abnormality, consider anterolateral ischemia  Abnormal ECG  When compared with ECG of 19-Jan-2025 14:12,  Nonspecific T wave abnormality has replaced inverted T waves in Inferior  leads    Referred By: AAAREFERRAL SELF           Confirmed By:                                   Imaging Results              X-Ray Chest PA And Lateral (Final result)  Result time 01/19/25 14:25:44      Final result by Kristina Fish MD (01/19/25 14:25:44)                   Impression:      Stable mild cardiomegaly with no confluent infiltrates or pleural effusions      Electronically signed by: Kristina Fish  Date:    01/19/2025  Time:    14:25               Narrative:    EXAMINATION:  XR CHEST PA AND LATERAL    CLINICAL HISTORY:  Cough, unspecified    FINDINGS:  PA and lateral chest is compared to 03/20/2024 shows mild cardiomegaly    Lungs are clear. Pulmonary vasculature is normal. No acute osseous abnormality.                                       Medications   acetaminophen tablet 1,000 mg (1,000 mg Oral Given 1/19/25 1306)   sodium chloride 0.9% bolus 1,000 mL 1,000 mL (0 mLs Intravenous Stopped 1/19/25 1550)     Medical Decision Making  MDM    Patient presents for emergent evaluation of acute cough and headache that poses a possible threat to life and/or bodily function.    Differential diagnosis included but not limited to flu, COVID, strep, pneumonia, upper viral respiratory illness, electrolyte abnormality, dehydration, anemia.  In the ED patient found to have acute clear lung sounds bilaterally with no increased work of breathing.  Patient is awake and alert interactive in no acute distress.  Patient's cranial nerves grossly intact.  Patient has a negative Romberg exam.  Patient noted to have mild facial weakness on the right side of her face which patient endorses it is from Bell's palsy in 2010 and she has never regained full function on the right side of  her face.  Patient has moist mucous membranes and cap refill less than 2 seconds.    Labs significant for positive influenza A.  Negative UPT.  Patient mildly tachycardic and feeling bad on rechecked vital signs, IV placed for fluid bolus and re-evaluation of lab work.  Labs significant for anemia hemoglobin 7.8, hematocrit 28.2, WBC 7.32, BNP 39, CMP significant for CO2 22.  Chest x-ray significant for stable mild cardiomegaly with no confluent infiltrates or pleural effusions.    Patient treated in the ED with oral Tylenol and IV normal saline.  Improvement in vital signs and improvement in symptoms.  Patient reports her headache is better after medications.  Patient ambulatory with a pulse ox without hypoxia, shortness breath, or significant tachycardia.      Discharge MDM  I discussed the patient presentation labs, ekg, X-rays, findings with ED attending Dr. Sena.    Patient was discharged in stable condition with close follow up with primary care.  Patient is within the window for Tamiflu.  Detailed return precautions discussed to return to the ED for any new or worsening concerns.  Patient verbalizes understanding.    NP uses Epic and voice recognition software prone to occasional and minor errors that may persist in the medical record.      Amount and/or Complexity of Data Reviewed  Labs: ordered. Decision-making details documented in ED Course.  Radiology: ordered and independent interpretation performed. Decision-making details documented in ED Course.  ECG/medicine tests: ordered and independent interpretation performed. Decision-making details documented in ED Course.    Risk  OTC drugs.  Prescription drug management.               ED Course as of 01/19/25 1810   Sun Jan 19, 2025   1302 Influenza A, Molecular(!!): Positive [MP]   1358 hCG Qualitative, Urine: Negative [MP]   1433 X-Ray Chest PA And Lateral  Impression:     Stable mild cardiomegaly with no confluent infiltrates or pleural effusions    [MP]   1434 EKG 12-lead  EKG reviewed with my attending.  EKG significant for sinus tachycardia with PVCs, ventricular rate 104 beats per minute, some T-wave flattening, no signs of occlusive MI [MP]   1454 WBC: 7.32 [MP]   1454 Hemoglobin(!): 7.8 [MP]   1454 Hematocrit(!): 28.2 [MP]   1454 MCV(!): 59 [MP]   1454 MCH(!): 16.2 [MP]   1454 MCHC(!): 27.7 [MP]   1454 RDW(!): 23.0 [MP]   1454 Platelet Count: 381 [MP]   1524 BNP: 39 [MP]   1525 Lymph #(!): 0.9 [MP]   1525 Gran %(!): 73.2 [MP]   1525 Lymph %(!): 12.7 [MP]   1548 CO2(!): 22 [MP]      ED Course User Index  [MP] Nayeli Castro NP                           Clinical Impression:  Final diagnoses:  [R05.9] Cough  [J10.1] Influenza A (Primary)          ED Disposition Condition    Discharge Stable          ED Prescriptions       Medication Sig Dispense Start Date End Date Auth. Provider    oseltamivir (TAMIFLU) 75 MG capsule Take 1 capsule (75 mg total) by mouth 2 (two) times daily. for 5 days 10 capsule 1/19/2025 1/24/2025 Nayeli Castro NP          Follow-up Information       Follow up With Specialties Details Why Contact Info Additional Information    [facility]  Schedule an appointment as soon as possible for a visit   [address]  [facility] [address]  [phone]       [facility] - Emergency Dept Emergency Medicine  If symptoms worsen [address]  [facility] [address] [phone] 1st floor             Nayeli Castro NP  01/19/25 1144

## 2025-01-19 NOTE — ED NOTES
Bed: [facility] 02 - A Chair  Expected date:   Expected time:   Means of arrival:   Comments:

## 2025-01-19 NOTE — Clinical Note
"Ninoska Fortune" Jose M was seen and treated in our emergency department on 1/19/2025.  She may return to work on 01/27/2025.       If you have any questions or concerns, please don't hesitate to call.      Nayeli Castro NP"

## 2025-01-22 LAB
OHS QRS DURATION: 94 MS
OHS QTC CALCULATION: 439 MS

## 2025-02-26 ENCOUNTER — HOSPITAL ENCOUNTER (EMERGENCY)
Facility: HOSPITAL | Age: 50
Discharge: HOME | End: 2025-02-26
Attending: EMERGENCY MEDICINE
Payer: MEDICAID

## 2025-02-26 VITALS
DIASTOLIC BLOOD PRESSURE: 84 MMHG | RESPIRATION RATE: 17 BRPM | SYSTOLIC BLOOD PRESSURE: 151 MMHG | TEMPERATURE: 98 F | WEIGHT: 293 LBS | HEART RATE: 86 BPM | HEIGHT: 67 IN | BODY MASS INDEX: 45.99 KG/M2 | OXYGEN SATURATION: 99 %

## 2025-02-26 DIAGNOSIS — K04.7 DENTAL INFECTION: ICD-10-CM

## 2025-02-26 DIAGNOSIS — K02.9 DENTAL CARIES: ICD-10-CM

## 2025-02-26 DIAGNOSIS — K08.89 PAIN, DENTAL: Primary | ICD-10-CM

## 2025-02-26 PROCEDURE — 99283 EMERGENCY DEPT VISIT LOW MDM: CPT

## 2025-02-26 RX ORDER — AMOXICILLIN 500 MG/1
500 CAPSULE ORAL 3 TIMES DAILY
Qty: 21 CAPSULE | Refills: 0 | Status: SHIPPED | OUTPATIENT
Start: 2025-02-26 | End: 2025-03-05

## 2025-02-26 NOTE — DISCHARGE INSTRUCTIONS
Amoxicillin as directed until all gone   Follow up as directed   Return if condition becomes worse for any concerns

## 2025-02-26 NOTE — ED PROVIDER NOTES
Encounter Date: 2/26/2025       History     Chief Complaint   Patient presents with    Dental Pain     50 year old female presents emergency department past medical history includes hypertension, obesity, anemia here for complaint of dental pain.  Patient states that she has had intermittent dental pain for the last 3-4 years.  Patient states that she can not find a dentist to pull her teeth.  She reports that she had some pus draining from 1 of her teeth yesterday.  Patient has no obvious facial or mandibular swelling.  She has had no fevers.    The history is provided by the patient.     Review of patient's allergies indicates:   Allergen Reactions    Aspirin      Past Medical History:   Diagnosis Date    Anemia, unspecified     Hypertension     Obese body habitus      No past surgical history on file.  No family history on file.  Social History[1]  Review of Systems   Constitutional:  Negative for chills and fever.   HENT:  Positive for dental problem.    Respiratory: Negative.     Cardiovascular: Negative.    Genitourinary: Negative.    Musculoskeletal: Negative.    Neurological: Negative.    Hematological: Negative.    Psychiatric/Behavioral: Negative.     All other systems reviewed and are negative.      Physical Exam     Initial Vitals [02/26/25 1341]   BP Pulse Resp Temp SpO2   (!) 151/84 86 17 98.4 °F (36.9 °C) 99 %      MAP       --         Physical Exam    Nursing note and vitals reviewed.  Constitutional: She appears well-developed and well-nourished.   HENT:   Head: Normocephalic and atraumatic.   Right Ear: External ear normal.   Left Ear: External ear normal. Mouth/Throat: Dental caries present.   Patient has very poor dentition, missing multiple teeth, multiple dental caries.  No trismus uvula is midline patient has no muffled voice changes, handling secretions well   Eyes: Conjunctivae and EOM are normal. Pupils are equal, round, and reactive to light.   Cardiovascular:  Normal rate, regular rhythm,  normal heart sounds and intact distal pulses.           Pulmonary/Chest: Breath sounds normal.   Abdominal: Abdomen is soft. Bowel sounds are normal.   Musculoskeletal:         General: Normal range of motion.     Neurological: She is alert and oriented to person, place, and time. She has normal strength. No cranial nerve deficit.   Skin: Skin is warm.         ED Course   Procedures  Labs Reviewed - No data to display       Imaging Results    None          Medications - No data to display  Medical Decision Making  50 year old female presents emergency department past medical history includes hypertension, obesity, anemia here for complaint of dental pain.  Patient states that she has had intermittent dental pain for the last 3-4 years.  Patient states that she can not find a dentist to pull her teeth.  She reports that she had some pus draining from 1 of her teeth yesterday.  Patient has no obvious facial or mandibular swelling.  She has had no fevers.    The history is provided by the patient.     Considerations include but not limited to, dental caries, dental abscess    50-year-old female presents emergency department complaining of dental pain.  Patient has diffuse dental caries, very poor dentition.  She has no facial or mandibular swelling.  Complaining of tenderness mostly to the left lower mandibular area.  Patient will be placed on amoxicillin, instructed follow up with dentist or [facility] for definitive care given return precautions                                      Clinical Impression:  Final diagnoses:  [K08.89] Pain, dental (Primary)  [K02.9] Dental caries  [K04.7] Dental infection          ED Disposition Condition    Discharge Stable          ED Prescriptions       Medication Sig Dispense Start Date End Date Auth. Provider    amoxicillin (AMOXIL) 500 MG capsule Take 1 capsule (500 mg total) by mouth 3 (three) times daily. for 7 days 21 capsule 2/26/2025 3/5/2025 Shi Sauer FNP          Follow-up  Information       Follow up With Specialties Details Why Contact Info    [facility]  Schedule an appointment as soon as possible for a visit in 1 week  [address]  [phone]               [1]   Social History  Tobacco Use    Smoking status: Unknown        Shi Sauer, SAMI  02/26/25 2970

## 2025-03-24 ENCOUNTER — HOSPITAL ENCOUNTER (EMERGENCY)
Facility: HOSPITAL | Age: 50
Discharge: HOME | End: 2025-03-24
Attending: EMERGENCY MEDICINE
Payer: MEDICAID

## 2025-03-24 VITALS
SYSTOLIC BLOOD PRESSURE: 182 MMHG | OXYGEN SATURATION: 100 % | BODY MASS INDEX: 45.99 KG/M2 | WEIGHT: 293 LBS | RESPIRATION RATE: 20 BRPM | TEMPERATURE: 99 F | DIASTOLIC BLOOD PRESSURE: 88 MMHG | HEIGHT: 67 IN | HEART RATE: 80 BPM

## 2025-03-24 DIAGNOSIS — R51.9 NONINTRACTABLE HEADACHE, UNSPECIFIED CHRONICITY PATTERN, UNSPECIFIED HEADACHE TYPE: ICD-10-CM

## 2025-03-24 DIAGNOSIS — R42 DIZZINESS: Primary | ICD-10-CM

## 2025-03-24 DIAGNOSIS — Z86.73 OLD CEREBRAL INFARCT WITHOUT RESIDUAL DEFICIT: ICD-10-CM

## 2025-03-24 LAB
ABSOLUTE EOSINOPHIL (SMH): 0.13 K/UL
ABSOLUTE MONOCYTE (SMH): 0.52 K/UL (ref 0.3–1)
ABSOLUTE NEUTROPHIL COUNT (SMH): 4.7 K/UL (ref 1.8–7.7)
ALBUMIN SERPL-MCNC: 3.8 G/DL (ref 3.5–5.2)
ALP SERPL-CCNC: 94 UNIT/L (ref 55–135)
ALT SERPL-CCNC: 12 UNIT/L (ref 10–44)
ANION GAP (SMH): 0 MMOL/L (ref 8–16)
AST SERPL-CCNC: 13 UNIT/L (ref 10–40)
BASOPHILS # BLD AUTO: 0.02 K/UL
BASOPHILS NFR BLD AUTO: 0.3 %
BILIRUB SERPL-MCNC: 0.3 MG/DL (ref 0.1–1)
BNP SERPL-MCNC: 47 PG/ML
BUN SERPL-MCNC: 15 MG/DL (ref 6–20)
CALCIUM SERPL-MCNC: 8.9 MG/DL (ref 8.7–10.5)
CHLORIDE SERPL-SCNC: 112 MMOL/L (ref 95–110)
CO2 SERPL-SCNC: 25 MMOL/L (ref 23–29)
CREAT SERPL-MCNC: 0.7 MG/DL (ref 0.5–1.4)
ERYTHROCYTE [DISTWIDTH] IN BLOOD BY AUTOMATED COUNT: 22.7 % (ref 11.5–14.5)
GFR SERPLBLD CREATININE-BSD FMLA CKD-EPI: >60 ML/MIN/1.73/M2
GLUCOSE SERPL-MCNC: 109 MG/DL (ref 70–110)
HCT VFR BLD AUTO: 28.8 % (ref 37–48.5)
HGB BLD-MCNC: 8.3 GM/DL (ref 12–16)
IMM GRANULOCYTES # BLD AUTO: 0.03 K/UL (ref 0–0.04)
IMM GRANULOCYTES NFR BLD AUTO: 0.4 % (ref 0–0.5)
LYMPHOCYTES # BLD AUTO: 2.32 K/UL (ref 1–4.8)
MAGNESIUM SERPL-MCNC: 2 MG/DL (ref 1.6–2.6)
MCH RBC QN AUTO: 17 PG (ref 27–31)
MCHC RBC AUTO-ENTMCNC: 28.8 G/DL (ref 32–36)
MCV RBC AUTO: 59 FL (ref 82–98)
NUCLEATED RBC (/100WBC) (SMH): 0 /100 WBC
OHS QRS DURATION: 94 MS
OHS QTC CALCULATION: 449 MS
OVALOCYTES BLD QL SMEAR: NORMAL
PLATELET # BLD AUTO: 397 K/UL (ref 150–450)
PLATELET BLD QL SMEAR: NORMAL
PMV BLD AUTO: ABNORMAL FL
POTASSIUM SERPL-SCNC: 3.6 MMOL/L (ref 3.5–5.1)
PROT SERPL-MCNC: 7.8 GM/DL (ref 6–8.4)
RBC # BLD AUTO: 4.87 M/UL (ref 4–5.4)
RELATIVE EOSINOPHIL (SMH): 1.7 % (ref 0–8)
RELATIVE LYMPHOCYTE (SMH): 29.9 % (ref 18–48)
RELATIVE MONOCYTE (SMH): 6.7 % (ref 4–15)
RELATIVE NEUTROPHIL (SMH): 61 % (ref 38–73)
SODIUM SERPL-SCNC: 137 MMOL/L (ref 136–145)
TROPONIN HIGH SENSITIVE (SMH): 5 PG/ML
WBC # BLD AUTO: 7.76 K/UL (ref 3.9–12.7)

## 2025-03-24 PROCEDURE — 85025 COMPLETE CBC W/AUTO DIFF WBC: CPT | Performed by: EMERGENCY MEDICINE

## 2025-03-24 PROCEDURE — 25000003 PHARM REV CODE 250: Performed by: EMERGENCY MEDICINE

## 2025-03-24 PROCEDURE — 99285 EMERGENCY DEPT VISIT HI MDM: CPT | Mod: 25

## 2025-03-24 PROCEDURE — 93005 ELECTROCARDIOGRAM TRACING: CPT | Performed by: INTERNAL MEDICINE

## 2025-03-24 PROCEDURE — 83880 ASSAY OF NATRIURETIC PEPTIDE: CPT | Performed by: EMERGENCY MEDICINE

## 2025-03-24 PROCEDURE — 83735 ASSAY OF MAGNESIUM: CPT | Performed by: EMERGENCY MEDICINE

## 2025-03-24 PROCEDURE — 84484 ASSAY OF TROPONIN QUANT: CPT | Performed by: EMERGENCY MEDICINE

## 2025-03-24 PROCEDURE — 80053 COMPREHEN METABOLIC PANEL: CPT | Performed by: EMERGENCY MEDICINE

## 2025-03-24 PROCEDURE — 93010 ELECTROCARDIOGRAM REPORT: CPT | Mod: ,,, | Performed by: INTERNAL MEDICINE

## 2025-03-24 RX ORDER — BUTALBITAL, ACETAMINOPHEN AND CAFFEINE 50; 325; 40 MG/1; MG/1; MG/1
1 TABLET ORAL EVERY 4 HOURS PRN
Status: DISCONTINUED | OUTPATIENT
Start: 2025-03-24 | End: 2025-03-24 | Stop reason: HOSPADM

## 2025-03-24 RX ORDER — BUTALBITAL, ACETAMINOPHEN AND CAFFEINE 50; 325; 40 MG/1; MG/1; MG/1
1 TABLET ORAL EVERY 4 HOURS PRN
Qty: 12 TABLET | Refills: 0 | Status: SHIPPED | OUTPATIENT
Start: 2025-03-24 | End: 2025-04-23

## 2025-03-24 RX ADMIN — BUTALBITAL, ACETAMINOPHEN, AND CAFFEINE 1 TABLET: 325; 50; 40 TABLET ORAL at 01:03

## 2025-03-24 NOTE — Clinical Note
"Ninoska Browncelio" Jose M was seen and treated in our emergency department on 3/24/2025.  She may return to work on 03/26/2025.       If you have any questions or concerns, please don't hesitate to call.      Aleta Ballesteros RN    "

## 2025-03-24 NOTE — ED PROVIDER NOTES
Encounter Date: 3/24/2025       History     Chief Complaint   Patient presents with    Nausea    Dizziness     Patient presents complaining of nausea, dizziness, headache.  Symptoms started yesterday persisted this morning.  Patient has a history of hypertension she did not take her amlodipine this morning.  Patient also has a history of Bell's palsy.  Patient has chronic facial droop on the right.  At the worst symptoms are mild-to-moderate.  Nothing makes it better or worse.      Review of patient's allergies indicates:   Allergen Reactions    Aspirin      Past Medical History:   Diagnosis Date    Anemia, unspecified     Hypertension     Obese body habitus      History reviewed. No pertinent surgical history.  No family history on file.  Social History[1]  Review of Systems   All other systems reviewed and are negative.      Physical Exam     Initial Vitals   BP Pulse Resp Temp SpO2   03/24/25 1045 03/24/25 1045 03/24/25 1054 03/24/25 1045 03/24/25 1045   (!) 163/89 103 18 98.5 °F (36.9 °C) 99 %      MAP       --                Physical Exam    Nursing note and vitals reviewed.  Constitutional: She appears well-developed and well-nourished.   Pleasant, polite   HENT:   Head: Normocephalic and atraumatic.   Eyes: EOM are normal.   Neck: Neck supple.   Normal range of motion.  Cardiovascular:  Normal rate, regular rhythm, normal heart sounds and intact distal pulses.           Pulmonary/Chest: No respiratory distress.   Musculoskeletal:         General: Normal range of motion.      Cervical back: Normal range of motion and neck supple.     Neurological: She is alert and oriented to person, place, and time. She has normal strength.   Vision - Normal  Aphasia - Normal  Neglect - Normal  Pronator drift - Normal  Cerebellum - Normal  RUE strength - Normal  RLE strength - Normal  LUE strength - Normal  LLE strength - Normal    There indeed is facial droop on the right which is chronic per patient for many years.   Skin:  Skin is warm and dry. Capillary refill takes less than 2 seconds.   Psychiatric: She has a normal mood and affect. Her behavior is normal. Judgment and thought content normal.         ED Course   Procedures  Labs Reviewed   COMPREHENSIVE METABOLIC PANEL - Abnormal       Result Value    Sodium 137      Potassium 3.6      Chloride 112 (*)     CO2 25      Glucose 109      BUN 15      Creatinine 0.7      Calcium 8.9      Protein Total 7.8      Albumin 3.8      Bilirubin Total 0.3      ALP 94      AST 13      ALT 12      Anion Gap 0 (*)     eGFR >60     CBC WITH DIFFERENTIAL - Abnormal    WBC 7.76      RBC 4.87      Hgb 8.3 (*)     Hct 28.8 (*)     MCV 59 (*)     MCH 17.0 (*)     MCHC 28.8 (*)     RDW 22.7 (*)     Platelet Count 397      MPV        Nucleated RBC 0      Neut % 61.0      Lymph % 29.9      Mono % 6.7      Eos % 1.7      Basophil % 0.3      Imm Grans % 0.4      Neut # 4.7      Lymph # 2.32      Mono # 0.52      Eos # 0.13      Baso # 0.02      Imm Grans # 0.03     MAGNESIUM - Normal    Magnesium 2.0     TROPONIN I HIGH SENSITIVITY - Normal    Troponin High Sensitive 5.0     B-TYPE NATRIURETIC PEPTIDE - Normal    BNP 47     CBC W/ AUTO DIFFERENTIAL    Narrative:     The following orders were created for panel order CBC auto differential.  Procedure                               Abnormality         Status                     ---------                               -----------         ------                     CBC with Differential[[id number]]       Abnormal            Final result                 Please view results for these tests on the individual orders.   MORPHOLOGY    Platelet Estimate Appears Normal      Ovalocytes Occasional          ECG Results              EKG 12-lead (Final result)        Collection Time Result Time QRS Duration OHS QTC Calculation    03/24/25 10:42:19 03/24/25 16:06:26 94 449                     Final result by Interface, Lab In [facility] (03/24/25 16:06:33)                    Narrative:    Test Reason : R07.9,    Vent. Rate :  86 BPM     Atrial Rate :  86 BPM     P-R Int : 136 ms          QRS Dur :  94 ms      QT Int : 376 ms       P-R-T Axes :  75  55 -39 degrees    QTcB Int : 449 ms    Normal sinus rhythm  Nonspecific T wave abnormality  Abnormal ECG  Confirmed by Jessica Landon ([id number]) on 3/24/2025 4:06:22 PM    Referred By: AAAREFERRAL SELF           Confirmed By: Jessica Landon                                  Imaging Results              CT Head Without Contrast (Final result)  Result time 03/24/25 12:05:06      Final result by Ermelinda Lyman IV, MD (03/24/25 12:05:06)                   Impression:      No acute intracranial abnormality.    Focal area of encephalomalacia within the right hemisphere likely related to remote small vessel infarction, unchanged.      Electronically signed by: Ermelinda Lyman  Date:    03/24/2025  Time:    12:05               Narrative:      CMS MANDATED QUALITY DATA - CT RADIATION - 436    All CT scans at this facility utilize dose modulation, iterative reconstruction, and/or weight based dosing when appropriate to reduce radiation dose to as low as reasonably achievable.    EXAMINATION:  CT HEAD WITHOUT CONTRAST    CLINICAL HISTORY:  Dizziness, persistent/recurrent, cardiac or vascular cause suspected;.    COMPARISON:  11/04/2022    FINDINGS:  A focal area of encephalomalacia extends from the right lentiform nucleus into the rightward corona radiata, potentially related to region of remote small vessel infarction and unchanged compared to prior examination.  There are no findings of acute hemorrhage or infarction. There is no evidence of an intra-axial mass, mass effect or shift of the midline. The ventricular system is appropriate in size and position for age. There are no extra-axial collections demonstrated.    No acute osseous abnormality is identified.  The visualized portions of paranasal sinuses and mastoid air cells are appropriately  aerated.                                       X-Ray Chest AP Portable (Final result)  Result time 03/24/25 11:21:39      Final result by Edvin Rose DO (03/24/25 11:21:39)                   Impression:      No acute cardiopulmonary abnormality.      Electronically signed by: Edvin Rose  Date:    03/24/2025  Time:    11:21               Narrative:    EXAMINATION:  XR CHEST AP PORTABLE    CLINICAL HISTORY:  Chest Pain;    FINDINGS:  Portable chest with comparison chest x-ray 01/19/2025.  Normal cardiomediastinal silhouette.Lungs are clear. Pulmonary vasculature is normal. No acute osseous abnormality.                                       Medications - No data to display  Medical Decision Making  Patient appears in no acute distress    Considerations include but are not limited to headache secondary to hypertension, tension headache, acute kidney injury, dehydration, less likely subarachnoid hemorrhage    MDM    Patient presents for emergent evaluation of acute headache, dizziness that poses a threat to life and/or bodily function.    In the ED patient found to have acute headache, dizziness.     Amount and/or complexity of data reviewed   I ordered labs and personally reviewed them.  Labs significant for anemia is present which is chronic no acute change, negative troponin, normal kidney function.  I ordered X-rays and personally reviewed them and reviewed the radiologist interpretation.  Xray significant for no acute cardiopulmonary process.    I ordered EKG and personally reviewed it.  EKG significant for irregular rate and rhythm without ST elevation, PVCs are present.    I ordered CT scan and personally reviewed it and reviewed the radiologist interpretation.  CT significant for evidence of old infarct.      I did review prior medical records.        Discharge MDM and Risk    Patient was managed in the ED with oral Fioricet  The response to treatment was improved.  Patient presenting with dizziness and  headache.  CT imaging reveals no evidence of subarachnoid hemorrhage.  Old infarct is present.  Patient with previous history of Bell's palsy has chronic facial droop.  Patient was advised also of additional finding of possible old infarct in need for additional follow up with primary care.  She voiced understanding.  In regards to the dizziness and headache today I think likely this is secondary to a tension headache with associated high blood pressure.  I advised patient take her amlodipine.  Shared decision-making with the patient regarding diagnosis, treatment, and plan was well received.    Patient was discharged in stable condition.  Detailed return precautions discussed.    Amount and/or Complexity of Data Reviewed  Labs: ordered. Decision-making details documented in ED Course.  Radiology: ordered.    Risk  Prescription drug management.               ED Course as of 03/24/25 1842   Mon Mar 24, 2025   1157 Magnesium : 2.0 [AP]   1157 WBC: 7.76 [AP]   1157 Hemoglobin(!): 8.3 [AP]   1157 Hematocrit(!): 28.8 [AP]   1157 Platelet Count: 397 [AP]   1157 Sodium: 137 [AP]   1157 CO2: 25 [AP]   1157 Glucose: 109 [AP]   1157 BUN: 15 [AP]   1157 Creatinine: 0.7 [AP]   1157 Calcium: 8.9 [AP]   1157 PROTEIN TOTAL: 7.8 [AP]   1157 Albumin: 3.8 [AP]   1157 BILIRUBIN TOTAL: 0.3 [AP]   1157 ALP: 94 [AP]   1157 AST: 13 [AP]   1157 Anion Gap(!): 0 [AP]   1157 eGFR: >60 [AP]   1157 BNP: 47 [AP]   1221 BNP: 47 [AP]      ED Course User Index  [AP] Seymour Rosenberg MD                           Clinical Impression:  Final diagnoses:  [R42] Dizziness (Primary)  [R51.9] Nonintractable headache, unspecified chronicity pattern, unspecified headache type  [Z86.73] Old cerebral infarct without residual deficit          ED Disposition Condition    Discharge Stable          ED Prescriptions       Medication Sig Dispense Start Date End Date Auth. Provider    butalbital-acetaminophen-caffeine -40 mg (FIORICET, ESGIC) -40 mg per  tablet Take 1 tablet by mouth every 4 (four) hours as needed for Pain. 12 tablet 3/24/2025 4/23/2025 Seymour Rosenberg MD          Follow-up Information       Follow up With Specialties Details Why Contact Info    Delfino Mir MD Internal Medicine Schedule an appointment as soon as possible for a visit   [address] [place] [address] [place]  [facility] [address]  [phone]                 [1]   Social History  Tobacco Use    Smoking status: Unknown        Seymour Rosenberg MD  03/24/25 7649

## 2025-03-27 LAB
OHS QRS DURATION: 96 MS
OHS QTC CALCULATION: 472 MS

## 2025-04-05 ENCOUNTER — HOSPITAL ENCOUNTER (EMERGENCY)
Facility: HOSPITAL | Age: 50
Discharge: HOME | End: 2025-04-05
Attending: EMERGENCY MEDICINE
Payer: MEDICAID

## 2025-04-05 VITALS
WEIGHT: 293 LBS | RESPIRATION RATE: 18 BRPM | HEART RATE: 85 BPM | DIASTOLIC BLOOD PRESSURE: 83 MMHG | TEMPERATURE: 98 F | BODY MASS INDEX: 45.99 KG/M2 | OXYGEN SATURATION: 100 % | HEIGHT: 67 IN | SYSTOLIC BLOOD PRESSURE: 172 MMHG

## 2025-04-05 DIAGNOSIS — B34.9 VIRAL SYNDROME: Primary | ICD-10-CM

## 2025-04-05 LAB
GROUP A STREP MOLECULAR (OHS): NEGATIVE
INFLUENZA A MOLECULAR (OHS): NEGATIVE
INFLUENZA B MOLECULAR (OHS): NEGATIVE
SARS-COV-2 RDRP RESP QL NAA+PROBE: NEGATIVE

## 2025-04-05 PROCEDURE — 87651 STREP A DNA AMP PROBE: CPT | Performed by: NURSE PRACTITIONER

## 2025-04-05 PROCEDURE — 87502 INFLUENZA DNA AMP PROBE: CPT | Performed by: NURSE PRACTITIONER

## 2025-04-05 PROCEDURE — 99282 EMERGENCY DEPT VISIT SF MDM: CPT

## 2025-04-05 PROCEDURE — U0002 COVID-19 LAB TEST NON-CDC: HCPCS | Performed by: NURSE PRACTITIONER

## 2025-04-05 NOTE — DISCHARGE INSTRUCTIONS
It may be too soon to test positive for COVID or flu since your symptoms started yesterday.  Please follow up with the primary care doctor.  You may test positive of the next couple of days.  Keep yourself well hydrated and rest.  Return to the ED for any worsening symptoms or any other concerns.

## 2025-04-05 NOTE — Clinical Note
"Ninoska Madridchristina Salguero was seen and treated in our emergency department on 4/5/2025.  She may return to work on 04/09/2025.       If you have any questions or concerns, please don't hesitate to call.      Ly Rizvi NP"

## 2025-04-05 NOTE — ED PROVIDER NOTES
Encounter Date: 4/5/2025       History     Chief Complaint   Patient presents with    COVID-19 Concerns     Wants to be checked for covid and flu, c/o body aches, cough     Presents with cough and body aches.  Onset yesterday.  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  She reports a cough with a clear sputum.  Denies fever nausea vomiting or diarrhea.  She reports she wants to be tested for flu and COVID.      Review of patient's allergies indicates:   Allergen Reactions    Aspirin      Past Medical History:   Diagnosis Date    Anemia, unspecified     Hypertension     Obese body habitus      History reviewed. No pertinent surgical history.  No family history on file.  Social History[1]  Review of Systems   Constitutional:  Negative for activity change, appetite change, chills and fever.        Generalized body aches.   Respiratory:  Positive for cough. Negative for shortness of breath and wheezing.    Cardiovascular:  Negative for chest pain, palpitations and leg swelling.   Gastrointestinal:  Negative for abdominal pain, diarrhea, nausea and vomiting.   Musculoskeletal:  Negative for back pain.   Skin:  Negative for rash.   Neurological:  Negative for dizziness, weakness and headaches.       Physical Exam     Initial Vitals [04/05/25 1034]   BP Pulse Resp Temp SpO2   (!) 183/90 100 18 98.2 °F (36.8 °C) 99 %      MAP       --         Physical Exam    Constitutional: She appears well-developed and well-nourished. No distress.   HENT:   Head: Normocephalic. Mouth/Throat: Oropharynx is clear and moist.   Airway patent.  Negative for erythema or exudate.   Eyes: Conjunctivae are normal.   Neck: Neck supple.   Normal range of motion.  Cardiovascular:  Normal rate, regular rhythm and normal heart sounds.           Pulmonary/Chest: Breath sounds normal. She has no rhonchi.   Abdominal: Abdomen is soft. Bowel sounds are normal. She exhibits no distension. There is no abdominal tenderness.   Musculoskeletal:          General: Normal range of motion.      Cervical back: Normal range of motion and neck supple.      Comments: Patient is ambulatory per self her gait is steady.  She moves all extremities without difficulty.  This patient is morbidly obese.     Neurological: She is alert and oriented to person, place, and time. No sensory deficit. GCS score is 15. GCS eye subscore is 4. GCS verbal subscore is 5. GCS motor subscore is 6.   Skin: Skin is warm and dry. Capillary refill takes less than 2 seconds.   Psychiatric: She has a normal mood and affect. Thought content normal.         ED Course   Procedures  Labs Reviewed   INFLUENZA A & B BY MOLECULAR - Normal       Result Value    INFLUENZA A MOLECULAR Negative      INFLUENZA B MOLECULAR  Negative     GROUP A STREP, MOLECULAR - Normal    Group A Strep Molecular Negative     SARS-COV-2 RNA AMPLIFICATION, QUAL - Normal    SARS COV-2 Molecular Negative            Imaging Results    None          Medications - No data to display  Medical Decision Making  Presents for COVID and influenza testing.  Patient reports body aches and cough.  She reports a clear sputum with the cough.  Denies shortness of breath fever nausea vomiting or diarrhea she does report body aches.  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  She reports she has been working a lot in his very tired.    Amount and/or Complexity of Data Reviewed  Labs:      Details: COVID influenza and strep were negative.  Discussion of management or test interpretation with external provider(s): I have explained to this patient that her labs are all negative.  She asked for a work excuse.  I have given him 1.  I have also explained that it may be too soon for her to be positive for any of these viruses.  She is to rest hydrate herself and return to the ED for any worsening of symptoms or any other concerns.  At no time while in the ED did she ever appear to be in any acute distress.                                       Clinical Impression:  Final diagnoses:  [B34.9] Viral syndrome (Primary)          ED Disposition Condition    Discharge Stable          ED Prescriptions    None       Follow-up Information       Follow up With Specialties Details Why Contact Info    Delfino Mir MD Internal Medicine In 3 days  [address] [facility] [address]  [phone]               Ly Rizvi NP  04/05/25 1432         [1]   Social History  Tobacco Use    Smoking status: Never    Smokeless tobacco: Never        Ly Rizvi NP  04/05/25 0177